# Patient Record
Sex: MALE | Race: BLACK OR AFRICAN AMERICAN | URBAN - METROPOLITAN AREA
[De-identification: names, ages, dates, MRNs, and addresses within clinical notes are randomized per-mention and may not be internally consistent; named-entity substitution may affect disease eponyms.]

---

## 2019-09-01 ENCOUNTER — INPATIENT (INPATIENT)
Facility: HOSPITAL | Age: 59
LOS: 3 days | DRG: 100 | End: 2019-09-05
Payer: COMMERCIAL

## 2019-09-01 VITALS
RESPIRATION RATE: 18 BRPM | OXYGEN SATURATION: 97 % | SYSTOLIC BLOOD PRESSURE: 111 MMHG | DIASTOLIC BLOOD PRESSURE: 64 MMHG | HEART RATE: 90 BPM

## 2019-09-01 DIAGNOSIS — E11.21 TYPE 2 DIABETES MELLITUS WITH DIABETIC NEPHROPATHY: ICD-10-CM

## 2019-09-01 DIAGNOSIS — Z78.9 OTHER SPECIFIED HEALTH STATUS: Chronic | ICD-10-CM

## 2019-09-01 LAB
ALBUMIN SERPL ELPH-MCNC: 4.1 G/DL — SIGNIFICANT CHANGE UP (ref 3.3–5)
ALBUMIN SERPL ELPH-MCNC: 4.4 G/DL — SIGNIFICANT CHANGE UP (ref 3.4–5)
ALP SERPL-CCNC: 108 U/L — SIGNIFICANT CHANGE UP (ref 40–120)
ALP SERPL-CCNC: 84 U/L — SIGNIFICANT CHANGE UP (ref 40–120)
ALT FLD-CCNC: 11 U/L — SIGNIFICANT CHANGE UP (ref 10–45)
ALT FLD-CCNC: 19 U/L — SIGNIFICANT CHANGE UP (ref 12–42)
ANION GAP SERPL CALC-SCNC: 14 MMOL/L — SIGNIFICANT CHANGE UP (ref 5–17)
ANION GAP SERPL CALC-SCNC: 25 MMOL/L — HIGH (ref 9–16)
APPEARANCE UR: ABNORMAL
APTT BLD: 22.6 SEC — LOW (ref 27.5–36.3)
AST SERPL-CCNC: 10 U/L — SIGNIFICANT CHANGE UP (ref 10–40)
AST SERPL-CCNC: 11 U/L — LOW (ref 15–37)
B-OH-BUTYR SERPL-SCNC: 0.5 MMOL/L — HIGH
BASE EXCESS BLDA CALC-SCNC: -3.2 MMOL/L — LOW (ref -2–3)
BASOPHILS NFR BLD AUTO: 0.5 % — SIGNIFICANT CHANGE UP (ref 0–2)
BILIRUB SERPL-MCNC: 0.4 MG/DL — SIGNIFICANT CHANGE UP (ref 0.2–1.2)
BILIRUB SERPL-MCNC: 0.9 MG/DL — SIGNIFICANT CHANGE UP (ref 0.2–1.2)
BILIRUB UR-MCNC: NEGATIVE — SIGNIFICANT CHANGE UP
BUN SERPL-MCNC: 13 MG/DL — SIGNIFICANT CHANGE UP (ref 7–23)
BUN SERPL-MCNC: 14 MG/DL — SIGNIFICANT CHANGE UP (ref 7–23)
CALCIUM SERPL-MCNC: 8.6 MG/DL — SIGNIFICANT CHANGE UP (ref 8.4–10.5)
CALCIUM SERPL-MCNC: 9.5 MG/DL — SIGNIFICANT CHANGE UP (ref 8.5–10.5)
CHLORIDE SERPL-SCNC: 106 MMOL/L — SIGNIFICANT CHANGE UP (ref 96–108)
CHLORIDE SERPL-SCNC: 99 MMOL/L — SIGNIFICANT CHANGE UP (ref 96–108)
CO2 SERPL-SCNC: 16 MMOL/L — LOW (ref 22–31)
CO2 SERPL-SCNC: 24 MMOL/L — SIGNIFICANT CHANGE UP (ref 22–31)
COLOR SPEC: YELLOW — SIGNIFICANT CHANGE UP
CREAT SERPL-MCNC: 0.93 MG/DL — SIGNIFICANT CHANGE UP (ref 0.5–1.3)
CREAT SERPL-MCNC: 1.41 MG/DL — HIGH (ref 0.5–1.3)
DIFF PNL FLD: ABNORMAL
EOSINOPHIL NFR BLD AUTO: 1.5 % — SIGNIFICANT CHANGE UP (ref 0–6)
ETHANOL SERPL-MCNC: <3 MG/DL — SIGNIFICANT CHANGE UP
GLUCOSE BLDC GLUCOMTR-MCNC: 122 MG/DL — HIGH (ref 70–99)
GLUCOSE BLDC GLUCOMTR-MCNC: 128 MG/DL — HIGH (ref 70–99)
GLUCOSE BLDC GLUCOMTR-MCNC: 132 MG/DL — HIGH (ref 70–99)
GLUCOSE BLDC GLUCOMTR-MCNC: 153 MG/DL — HIGH (ref 70–99)
GLUCOSE BLDC GLUCOMTR-MCNC: 205 MG/DL — HIGH (ref 70–99)
GLUCOSE SERPL-MCNC: 226 MG/DL — HIGH (ref 70–99)
GLUCOSE SERPL-MCNC: 406 MG/DL — HIGH (ref 70–99)
GLUCOSE UR QL: NEGATIVE — SIGNIFICANT CHANGE UP
HBA1C BLD-MCNC: 12.4 % — HIGH (ref 4–5.6)
HCO3 BLDA-SCNC: 20 MMOL/L — LOW (ref 21–28)
HCT VFR BLD CALC: 41.4 % — SIGNIFICANT CHANGE UP (ref 39–50)
HCT VFR BLD CALC: 47.8 % — SIGNIFICANT CHANGE UP (ref 39–50)
HGB BLD-MCNC: 13.3 G/DL — SIGNIFICANT CHANGE UP (ref 13–17)
HGB BLD-MCNC: 15.1 G/DL — SIGNIFICANT CHANGE UP (ref 13–17)
IMM GRANULOCYTES NFR BLD AUTO: 1.2 % — SIGNIFICANT CHANGE UP (ref 0–1.5)
INR BLD: 1.01 — SIGNIFICANT CHANGE UP (ref 0.88–1.16)
KETONES UR-MCNC: NEGATIVE — SIGNIFICANT CHANGE UP
LACTATE SERPL-SCNC: 1.3 MMOL/L — SIGNIFICANT CHANGE UP (ref 0.5–2)
LACTATE SERPL-SCNC: 4.3 MMOL/L — CRITICAL HIGH (ref 0.5–2)
LACTATE SERPL-SCNC: >15 MMOL/L — CRITICAL HIGH (ref 0.4–2)
LEUKOCYTE ESTERASE UR-ACNC: NEGATIVE — SIGNIFICANT CHANGE UP
LYMPHOCYTES # BLD AUTO: 27.5 % — SIGNIFICANT CHANGE UP (ref 13–44)
MAGNESIUM SERPL-MCNC: 2 MG/DL — SIGNIFICANT CHANGE UP (ref 1.6–2.6)
MCHC RBC-ENTMCNC: 29.4 PG — SIGNIFICANT CHANGE UP (ref 27–34)
MCHC RBC-ENTMCNC: 29.8 PG — SIGNIFICANT CHANGE UP (ref 27–34)
MCHC RBC-ENTMCNC: 31.6 G/DL — LOW (ref 32–36)
MCHC RBC-ENTMCNC: 32.1 GM/DL — SIGNIFICANT CHANGE UP (ref 32–36)
MCV RBC AUTO: 92.6 FL — SIGNIFICANT CHANGE UP (ref 80–100)
MCV RBC AUTO: 93 FL — SIGNIFICANT CHANGE UP (ref 80–100)
MONOCYTES NFR BLD AUTO: 6.1 % — SIGNIFICANT CHANGE UP (ref 2–14)
NEUTROPHILS NFR BLD AUTO: 63.2 % — SIGNIFICANT CHANGE UP (ref 43–77)
NITRITE UR-MCNC: NEGATIVE — SIGNIFICANT CHANGE UP
NRBC # BLD: 0 /100 WBCS — SIGNIFICANT CHANGE UP (ref 0–0)
PCO2 BLDA: 32 MMHG — LOW (ref 35–48)
PCO2 BLDV: 47 MMHG — SIGNIFICANT CHANGE UP (ref 41–51)
PH BLDA: 7.42 — SIGNIFICANT CHANGE UP (ref 7.35–7.45)
PH BLDV: 7.12 — CRITICAL LOW (ref 7.32–7.43)
PH UR: 5.5 — SIGNIFICANT CHANGE UP (ref 5–8)
PHENOBARB SERPL-MCNC: <2.1 UG/ML — LOW (ref 15–40)
PHENYTOIN FREE SERPL-MCNC: 1.2 UG/ML — LOW (ref 10–20)
PLATELET # BLD AUTO: 269 K/UL — SIGNIFICANT CHANGE UP (ref 150–400)
PLATELET # BLD AUTO: 378 K/UL — SIGNIFICANT CHANGE UP (ref 150–400)
PO2 BLDA: 116 MMHG — HIGH (ref 83–108)
PO2 BLDV: 63 MMHG — HIGH (ref 35–40)
POTASSIUM SERPL-MCNC: 3.4 MMOL/L — LOW (ref 3.5–5.3)
POTASSIUM SERPL-MCNC: 3.9 MMOL/L — SIGNIFICANT CHANGE UP (ref 3.5–5.3)
POTASSIUM SERPL-SCNC: 3.4 MMOL/L — LOW (ref 3.5–5.3)
POTASSIUM SERPL-SCNC: 3.9 MMOL/L — SIGNIFICANT CHANGE UP (ref 3.5–5.3)
PROT SERPL-MCNC: 6.6 G/DL — SIGNIFICANT CHANGE UP (ref 6–8.3)
PROT SERPL-MCNC: 8.4 G/DL — HIGH (ref 6.4–8.2)
PROT UR-MCNC: 30 MG/DL
PROTHROM AB SERPL-ACNC: 11.1 SEC — SIGNIFICANT CHANGE UP (ref 10–12.9)
RBC # BLD: 4.47 M/UL — SIGNIFICANT CHANGE UP (ref 4.2–5.8)
RBC # BLD: 5.14 M/UL — SIGNIFICANT CHANGE UP (ref 4.2–5.8)
RBC # FLD: 12.6 % — SIGNIFICANT CHANGE UP (ref 10.3–14.5)
RBC # FLD: 12.9 % — SIGNIFICANT CHANGE UP (ref 10.3–14.5)
SAO2 % BLDA: 98 % — SIGNIFICANT CHANGE UP (ref 95–100)
SAO2 % BLDV: 85 % — SIGNIFICANT CHANGE UP
SODIUM SERPL-SCNC: 140 MMOL/L — SIGNIFICANT CHANGE UP (ref 132–145)
SODIUM SERPL-SCNC: 144 MMOL/L — SIGNIFICANT CHANGE UP (ref 135–145)
SP GR SPEC: 1.01 — SIGNIFICANT CHANGE UP (ref 1–1.03)
T4 AB SER-ACNC: 9.21 UG/DL — SIGNIFICANT CHANGE UP (ref 3.17–11.72)
T4 FREE SERPL-MCNC: 1.12 NG/DL — SIGNIFICANT CHANGE UP (ref 0.7–1.48)
TROPONIN I SERPL-MCNC: <0.017 NG/ML — LOW (ref 0.02–0.06)
TSH SERPL-MCNC: 4.15 UIU/ML — HIGH (ref 0.36–3.74)
UROBILINOGEN FLD QL: 0.2 E.U./DL — SIGNIFICANT CHANGE UP
WBC # BLD: 13.76 K/UL — HIGH (ref 3.8–10.5)
WBC # BLD: 17.1 K/UL — HIGH (ref 3.8–10.5)
WBC # FLD AUTO: 13.76 K/UL — HIGH (ref 3.8–10.5)
WBC # FLD AUTO: 17.1 K/UL — HIGH (ref 3.8–10.5)

## 2019-09-01 PROCEDURE — 72125 CT NECK SPINE W/O DYE: CPT | Mod: 26

## 2019-09-01 PROCEDURE — 99222 1ST HOSP IP/OBS MODERATE 55: CPT

## 2019-09-01 PROCEDURE — 99291 CRITICAL CARE FIRST HOUR: CPT | Mod: 25

## 2019-09-01 PROCEDURE — 71045 X-RAY EXAM CHEST 1 VIEW: CPT | Mod: 26,77

## 2019-09-01 PROCEDURE — 31500 INSERT EMERGENCY AIRWAY: CPT

## 2019-09-01 PROCEDURE — 70450 CT HEAD/BRAIN W/O DYE: CPT | Mod: 26

## 2019-09-01 PROCEDURE — 71045 X-RAY EXAM CHEST 1 VIEW: CPT | Mod: 26

## 2019-09-01 PROCEDURE — 99292 CRITICAL CARE ADDL 30 MIN: CPT | Mod: 25

## 2019-09-01 PROCEDURE — 99291 CRITICAL CARE FIRST HOUR: CPT

## 2019-09-01 RX ORDER — CHLORHEXIDINE GLUCONATE 213 G/1000ML
1 SOLUTION TOPICAL
Refills: 0 | Status: DISCONTINUED | OUTPATIENT
Start: 2019-09-01 | End: 2019-09-03

## 2019-09-01 RX ORDER — ROCURONIUM BROMIDE 10 MG/ML
40 VIAL (ML) INTRAVENOUS ONCE
Refills: 0 | Status: COMPLETED | OUTPATIENT
Start: 2019-09-01 | End: 2019-09-01

## 2019-09-01 RX ORDER — ROCURONIUM BROMIDE 10 MG/ML
60 VIAL (ML) INTRAVENOUS ONCE
Refills: 0 | Status: COMPLETED | OUTPATIENT
Start: 2019-09-01 | End: 2019-09-01

## 2019-09-01 RX ORDER — INSULIN LISPRO 100/ML
VIAL (ML) SUBCUTANEOUS
Refills: 0 | Status: DISCONTINUED | OUTPATIENT
Start: 2019-09-01 | End: 2019-09-05

## 2019-09-01 RX ORDER — PANTOPRAZOLE SODIUM 20 MG/1
40 TABLET, DELAYED RELEASE ORAL EVERY 24 HOURS
Refills: 0 | Status: DISCONTINUED | OUTPATIENT
Start: 2019-09-01 | End: 2019-09-02

## 2019-09-01 RX ORDER — PROPOFOL 10 MG/ML
50 INJECTION, EMULSION INTRAVENOUS ONCE
Refills: 0 | Status: COMPLETED | OUTPATIENT
Start: 2019-09-01 | End: 2019-09-01

## 2019-09-01 RX ORDER — PROPOFOL 10 MG/ML
5 INJECTION, EMULSION INTRAVENOUS
Qty: 1000 | Refills: 0 | Status: DISCONTINUED | OUTPATIENT
Start: 2019-09-01 | End: 2019-09-02

## 2019-09-01 RX ORDER — DEXTROSE 50 % IN WATER 50 %
15 SYRINGE (ML) INTRAVENOUS ONCE
Refills: 0 | Status: DISCONTINUED | OUTPATIENT
Start: 2019-09-01 | End: 2019-09-05

## 2019-09-01 RX ORDER — ENOXAPARIN SODIUM 100 MG/ML
40 INJECTION SUBCUTANEOUS EVERY 24 HOURS
Refills: 0 | Status: DISCONTINUED | OUTPATIENT
Start: 2019-09-01 | End: 2019-09-05

## 2019-09-01 RX ORDER — SUCCINYLCHOLINE CHLORIDE 100 MG/5ML
100 SYRINGE (ML) INTRAVENOUS ONCE
Refills: 0 | Status: COMPLETED | OUTPATIENT
Start: 2019-09-01 | End: 2019-09-01

## 2019-09-01 RX ORDER — INSULIN HUMAN 100 [IU]/ML
9 INJECTION, SOLUTION SUBCUTANEOUS ONCE
Refills: 0 | Status: COMPLETED | OUTPATIENT
Start: 2019-09-01 | End: 2019-09-01

## 2019-09-01 RX ORDER — LEVETIRACETAM 250 MG/1
1000 TABLET, FILM COATED ORAL EVERY 12 HOURS
Refills: 0 | Status: DISCONTINUED | OUTPATIENT
Start: 2019-09-01 | End: 2019-09-03

## 2019-09-01 RX ORDER — DEXTROSE 50 % IN WATER 50 %
25 SYRINGE (ML) INTRAVENOUS ONCE
Refills: 0 | Status: DISCONTINUED | OUTPATIENT
Start: 2019-09-01 | End: 2019-09-05

## 2019-09-01 RX ORDER — SODIUM CHLORIDE 9 MG/ML
1000 INJECTION, SOLUTION INTRAVENOUS
Refills: 0 | Status: DISCONTINUED | OUTPATIENT
Start: 2019-09-01 | End: 2019-09-05

## 2019-09-01 RX ORDER — FENTANYL CITRATE 50 UG/ML
0.5 INJECTION INTRAVENOUS
Qty: 2500 | Refills: 0 | Status: DISCONTINUED | OUTPATIENT
Start: 2019-09-01 | End: 2019-09-02

## 2019-09-01 RX ORDER — SODIUM CHLORIDE 9 MG/ML
1000 INJECTION INTRAMUSCULAR; INTRAVENOUS; SUBCUTANEOUS ONCE
Refills: 0 | Status: COMPLETED | OUTPATIENT
Start: 2019-09-01 | End: 2019-09-01

## 2019-09-01 RX ORDER — CEFTRIAXONE 500 MG/1
2000 INJECTION, POWDER, FOR SOLUTION INTRAMUSCULAR; INTRAVENOUS ONCE
Refills: 0 | Status: COMPLETED | OUTPATIENT
Start: 2019-09-01 | End: 2019-09-01

## 2019-09-01 RX ORDER — SODIUM CHLORIDE 9 MG/ML
1000 INJECTION INTRAMUSCULAR; INTRAVENOUS; SUBCUTANEOUS
Refills: 0 | Status: DISCONTINUED | OUTPATIENT
Start: 2019-09-01 | End: 2019-09-01

## 2019-09-01 RX ORDER — GLUCAGON INJECTION, SOLUTION 0.5 MG/.1ML
1 INJECTION, SOLUTION SUBCUTANEOUS ONCE
Refills: 0 | Status: DISCONTINUED | OUTPATIENT
Start: 2019-09-01 | End: 2019-09-05

## 2019-09-01 RX ORDER — ETOMIDATE 2 MG/ML
20 INJECTION INTRAVENOUS ONCE
Refills: 0 | Status: COMPLETED | OUTPATIENT
Start: 2019-09-01 | End: 2019-09-01

## 2019-09-01 RX ORDER — DEXTROSE 50 % IN WATER 50 %
12.5 SYRINGE (ML) INTRAVENOUS ONCE
Refills: 0 | Status: DISCONTINUED | OUTPATIENT
Start: 2019-09-01 | End: 2019-09-05

## 2019-09-01 RX ORDER — MIDAZOLAM HYDROCHLORIDE 1 MG/ML
4 INJECTION, SOLUTION INTRAMUSCULAR; INTRAVENOUS ONCE
Refills: 0 | Status: DISCONTINUED | OUTPATIENT
Start: 2019-09-01 | End: 2019-09-01

## 2019-09-01 RX ORDER — NALOXONE HYDROCHLORIDE 4 MG/.1ML
0.2 SPRAY NASAL ONCE
Refills: 0 | Status: COMPLETED | OUTPATIENT
Start: 2019-09-01 | End: 2019-09-01

## 2019-09-01 RX ADMIN — ETOMIDATE 20 MILLIGRAM(S): 2 INJECTION INTRAVENOUS at 08:49

## 2019-09-01 RX ADMIN — SODIUM CHLORIDE 1000 MILLILITER(S): 9 INJECTION INTRAMUSCULAR; INTRAVENOUS; SUBCUTANEOUS at 11:19

## 2019-09-01 RX ADMIN — Medication 60 MILLIGRAM(S): at 10:09

## 2019-09-01 RX ADMIN — LEVETIRACETAM 400 MILLIGRAM(S): 250 TABLET, FILM COATED ORAL at 23:01

## 2019-09-01 RX ADMIN — PROPOFOL 2.7 MICROGRAM(S)/KG/MIN: 10 INJECTION, EMULSION INTRAVENOUS at 17:28

## 2019-09-01 RX ADMIN — Medication 40 MILLIGRAM(S): at 09:14

## 2019-09-01 RX ADMIN — Medication 100 MILLIGRAM(S): at 08:49

## 2019-09-01 RX ADMIN — PROPOFOL 2.7 MICROGRAM(S)/KG/MIN: 10 INJECTION, EMULSION INTRAVENOUS at 13:50

## 2019-09-01 RX ADMIN — PANTOPRAZOLE SODIUM 40 MILLIGRAM(S): 20 TABLET, DELAYED RELEASE ORAL at 11:58

## 2019-09-01 RX ADMIN — PROPOFOL 2.7 MICROGRAM(S)/KG/MIN: 10 INJECTION, EMULSION INTRAVENOUS at 21:03

## 2019-09-01 RX ADMIN — PROPOFOL 2.7 MICROGRAM(S)/KG/MIN: 10 INJECTION, EMULSION INTRAVENOUS at 10:15

## 2019-09-01 RX ADMIN — MIDAZOLAM HYDROCHLORIDE 4 MILLIGRAM(S): 1 INJECTION, SOLUTION INTRAMUSCULAR; INTRAVENOUS at 09:32

## 2019-09-01 RX ADMIN — Medication 2 MILLIGRAM(S): at 07:55

## 2019-09-01 RX ADMIN — PROPOFOL 50 MILLIGRAM(S): 10 INJECTION, EMULSION INTRAVENOUS at 08:55

## 2019-09-01 RX ADMIN — Medication 4: at 11:50

## 2019-09-01 RX ADMIN — LEVETIRACETAM 400 MILLIGRAM(S): 250 TABLET, FILM COATED ORAL at 11:42

## 2019-09-01 RX ADMIN — CEFTRIAXONE 100 MILLIGRAM(S): 500 INJECTION, POWDER, FOR SOLUTION INTRAMUSCULAR; INTRAVENOUS at 09:11

## 2019-09-01 RX ADMIN — ENOXAPARIN SODIUM 40 MILLIGRAM(S): 100 INJECTION SUBCUTANEOUS at 22:38

## 2019-09-01 RX ADMIN — NALOXONE HYDROCHLORIDE 0.2 MILLIGRAM(S): 4 SPRAY NASAL at 07:49

## 2019-09-01 RX ADMIN — FENTANYL CITRATE 4.5 MICROGRAM(S)/KG/HR: 50 INJECTION INTRAVENOUS at 12:46

## 2019-09-01 RX ADMIN — INSULIN HUMAN 9 UNIT(S): 100 INJECTION, SOLUTION SUBCUTANEOUS at 09:49

## 2019-09-01 RX ADMIN — SODIUM CHLORIDE 200 MILLILITER(S): 9 INJECTION INTRAMUSCULAR; INTRAVENOUS; SUBCUTANEOUS at 12:35

## 2019-09-01 NOTE — PATIENT PROFILE ADULT - NSPROGENSOURCEINFO_GEN_A_NUR
lacks capacity and has no surrogate decision maker lacks capacity and has no surrogate decision maker/patient

## 2019-09-01 NOTE — H&P ADULT - NSHPLABSRESULTS_GEN_ALL_CORE
.  LABS:                         13.3   13.76 )-----------( 269      ( 01 Sep 2019 11:38 )             41.4     09-01    144  |  106  |  13  ----------------------------<  226<H>  3.9   |  24  |  0.93    Ca    8.6      01 Sep 2019 11:38  Mg     2.0     09-01    TPro  6.6  /  Alb  4.1  /  TBili  0.4  /  DBili  x   /  AST  10  /  ALT  11  /  AlkPhos  84  09-01    PT/INR - ( 01 Sep 2019 08:23 )   PT: 11.1 sec;   INR: 1.01          PTT - ( 01 Sep 2019 08:23 )  PTT:22.6 sec    CARDIAC MARKERS ( 01 Sep 2019 08:23 )  <0.017 ng/mL / x     / x     / x     / x            Lactate, Blood: 4.3 mmoL/L (09-01 @ 11:37)  Lactate, Blood: >15.0 mmoL/L (09-01 @ 08:31)      RADIOLOGY, EKG & ADDITIONAL TESTS: Reviewed.

## 2019-09-01 NOTE — H&P ADULT - NSHPPHYSICALEXAM_GEN_ALL_CORE
.  VITAL SIGNS:  T(C): 36.6 (09-01-19 @ 13:05), Max: 36.6 (09-01-19 @ 13:05)  T(F): 97.9 (09-01-19 @ 13:05), Max: 97.9 (09-01-19 @ 13:05)  HR: 70 (09-01-19 @ 16:00) (66 - 130)  BP: 113/66 (09-01-19 @ 16:00) (96/53 - 181/86)  BP(mean): 85 (09-01-19 @ 16:00) (74 - 102)  RR: 17 (09-01-19 @ 16:00) (13 - 21)  SpO2: 99% (09-01-19 @ 16:00) (96% - 100%)  Wt(kg): --    PHYSICAL EXAM:    Constitutional: Intubated and sedated on propofol and fentanyl   Head: NC/AT  Eyes: PERRL, EOMI, anicteric sclera  ENT: no nasal discharge; uvula midline, no oropharyngeal erythema or exudates; MMM  Neck: supple; no JVD or thyromegaly  Respiratory: CTA B/L; no W/R/R, no retractions  Cardiac: +S1/S2; RRR; no M/R/G; PMI non-displaced  Gastrointestinal: abdomen soft, NT/ND; no rebound or guarding; +BSx4  Genitourinary: normal external genitalia  Back: spine midline, no bony tenderness or step-offs; no CVAT B/L  Extremities: WWP, no clubbing or cyanosis; no peripheral edema  Musculoskeletal: NROM x4; no joint swelling, tenderness or erythema  Vascular: 2+ radial, femoral, DP/PT pulses B/L  Dermatologic: skin warm, dry and intact; no rashes, wounds, or scars  Lymphatic: no submandibular or cervical LAD  Neurologic: AAOx3; CNII-XII grossly intact; no focal deficits  Psychiatric: affect and characteristics of appearance, verbalizations, behaviors are appropriate .  VITAL SIGNS:  T(C): 36.6 (09-01-19 @ 13:05), Max: 36.6 (09-01-19 @ 13:05)  T(F): 97.9 (09-01-19 @ 13:05), Max: 97.9 (09-01-19 @ 13:05)  HR: 70 (09-01-19 @ 16:00) (66 - 130)  BP: 113/66 (09-01-19 @ 16:00) (96/53 - 181/86)  BP(mean): 85 (09-01-19 @ 16:00) (74 - 102)  RR: 17 (09-01-19 @ 16:00) (13 - 21)  SpO2: 99% (09-01-19 @ 16:00) (96% - 100%)  Wt(kg): --    PHYSICAL EXAM:    Constitutional: Intubated and sedated on propofol and fentanyl   Head: NC/AT  Eyes: PERRL 2mm B/L, anicteric sclera  HEENT: MMM  Respiratory: CTA B/L; no W/R/R, no retractions  Cardiac: +S1/S2; RRR; no M/R/G  Gastrointestinal: abdomen soft, ND; +BSx4  Extremities: WWP, no clubbing or cyanosis; no peripheral edema  Vascular: 2+ radial, DP/PT pulses B/L  Dermatologic: skin warm, dry and intact; no rashes  Neurologic: Intubate and sedated, does not respond to noxious or verbal stimuli. Gag, pupil and corneal reflexes are intact

## 2019-09-01 NOTE — ED ADULT NURSE NOTE - OBJECTIVE STATEMENT
58y male presents to ED BIBA c/o multiple seizure episodes. Known hx of seizures, per EMS had one episode on train, dried blood found on shirt and around mouth. EMS endorses a second episode en route to ED lasting about 2 mins. no obvious signs of head injury, pupils constricted bilat. placed on cont pulse ox and cardiac telemetry, NRB, MD aware.

## 2019-09-01 NOTE — ED ADULT TRIAGE NOTE - CHIEF COMPLAINT QUOTE
Pt  brought in by EMS for seizures. PT had seizure on the train and witnessed seizure by 2 minutes EMS en route to ED. Blood noted to shirt. Pt postictal at this time.

## 2019-09-01 NOTE — ED ADULT NURSE NOTE - NSIMPLEMENTINTERV_GEN_ALL_ED
Implemented All Fall Risk Interventions:  [place] to call system. Call bell, personal items and telephone within reach. Instruct patient to call for assistance. Room bathroom lighting operational. Non-slip footwear when patient is off stretcher. Physically safe environment: no spills, clutter or unnecessary equipment. Stretcher in lowest position, wheels locked, appropriate side rails in place. Provide visual cue, wrist band, yellow gown, etc. Monitor gait and stability. Monitor for mental status changes and reorient to person, place, and time. Review medications for side effects contributing to fall risk. Reinforce activity limits and safety measures with patient and family.

## 2019-09-01 NOTE — ED PROVIDER NOTE - DIAGNOSTIC INTERPRETATION
Interpreted by ED physician Teresa   Chest x-ray, 1 view, Seizure  Lungs clear, heart shadow normal, bony structures normal, no free air under diaphragm, no PTX     Interpreted by ED physician Teresa   Chest x-ray, 1 view, Intubation, Post NG tube  Lungs clear, heart shadow normal, bony structures normal, no free air under diaphragm, no PTX

## 2019-09-01 NOTE — CONSULT NOTE ADULT - ASSESSMENT
58-year-old man presenting with status epilepticus (4 seizures within a 1-2 hour window without return to baseline in between).  I suspect that these seizures are most likely provoked by his hyperglycemia and metabolic acidosis, however it is possible that he may also have an underlying focal or generalized epilepsy.  Would recommend continuing Keppra 1g BID for seizure protection until metabolic disarray is corrected.  At that time we will be able to review his EEG off sedation and ideally obtain history from the patient regarding his seizure history in order to determine whether he needs long team AED treatment.    # Status epilepticus  -Keppra 1g BID  -Continue propofol   -Continue vEEG  -Management of metabolic abnormalities/DKA per MICU  -Not currently stable for MRI, will consider obtained once patient is stable in order to evaluate for lesional seizure focus  -Will follow    Mag Diamond MD  Attending Neurologist  Epilepsy/EEG 58-year-old man presenting with status epilepticus (4 seizures within a 1-2 hour window without return to baseline in between).  I suspect that these seizures are most likely provoked by his hyperglycemia and metabolic acidosis, however it is possible that he may also have an underlying focal or generalized epilepsy.  Would recommend continuing Keppra 1g BID for seizure protection until metabolic disarray is corrected.  At that time we will be able to review his EEG off sedation and ideally obtain history from the patient regarding his seizure history in order to determine whether he needs long team AED treatment.    # Status epilepticus, possible new diagnosis of provoked seizures and/or epilepsy  -Keppra 1g BID  -Continue propofol   -Continue vEEG  -Management of metabolic abnormalities/DKA per MICU  -Not currently stable for MRI, will consider obtained once patient is stable in order to evaluate for lesional seizure focus  -Will follow    Mag Diamond MD  Attending Neurologist  Epilepsy/EEG 58-year-old man presenting with status epilepticus (4 seizures within a 1-2 hour window without return to baseline in between).  I suspect that these seizures are most likely provoked by his hyperglycemia and metabolic acidosis, however it is possible that he may also have an underlying focal or generalized epilepsy.  Would recommend continuing Keppra 1g BID for seizure protection until metabolic disarray is corrected.  At that time we will be able to review his EEG off sedation and ideally obtain history from the patient regarding his seizure history in order to determine whether he needs long team AED treatment.    # Status epilepticus, possible new diagnosis of provoked seizures and/or epilepsy  -Keppra 1g BID  -Continue propofol   -Continue vEEG  -Management of metabolic abnormalities/DKA per MICU  -Not currently stable for MRI, will consider obtaining once patient is stable in order to evaluate for lesional seizure focus  -Will follow    Mag Diamond MD  Attending Neurologist  Epilepsy/EEG

## 2019-09-01 NOTE — CONSULT NOTE ADULT - SUBJECTIVE AND OBJECTIVE BOX
Epilepsy Consult Note    Patient Name:SHAHANA RAMIRES  MRN:[id number]  Date/Time:09-01-19 @ 13:43    Reason for consult: seizures    History of Present Illness:  58-year-old man with unknown past medical history (probable uncontrolled diabetes as A1c 12.4) who is admitted following 4 seizures earlier today.  Epilepsy is consulted regarding seizure management.  Patient was intubated and sedated at the time of my exam and thus unable to provide history.  History below obtained from [facility] ED attending Dr. Moore and the medical record.    [place] began having a GTC with urinary incontinence.  Train was stopped, EMS was called.  On their arrival he was noted to have pinpoint pupils and was treated with Narcan.  Had a second seizure following EMS arrival.  Brought to [facility] where he had two additional seizures, without return to baseline.  He was treated with Keppra 1g IV and Ativan 2 mg IV, then intubated and started on propofol for airway protection.  Labs at [facility] notable for WBC 17.1, lactate > 15, FSBG 300s-400s, VBG 7.12/47/63, tox+ for opiates and methadone.  Head CT did not show hemorrhage or other acute abnormalities.  He was transferred to [facility] MICU for further management of diabetic ketoacidosis and seizures.    Epilepsy Risk Factors:   Unable to obtain as patient was intubated/sedated.    Prior AEDs:  Unable to obtain as patient was intubated/sedated.    PAST MEDICAL & SURGICAL HISTORY:  Unable to obtain as patient was intubated/sedated.  Likely diabetes mellitus as A1c 12.4    MEDICATIONS  (STANDING):  chlorhexidine 4% Liquid 1 Application(s) Topical <User Schedule>  dextrose 5%. 1000 milliLiter(s) (50 mL/Hr) IV Continuous <Continuous>  dextrose 50% Injectable 12.5 Gram(s) IV Push once  dextrose 50% Injectable 25 Gram(s) IV Push once  dextrose 50% Injectable 25 Gram(s) IV Push once  fentaNYL   Infusion. 0.5 MICROgram(s)/kG/Hr (4.5 mL/Hr) IV Continuous <Continuous>  insulin lispro (HumaLOG) corrective regimen sliding scale   SubCutaneous Before meals and at bedtime  levETIRAcetam  IVPB 1000 milliGRAM(s) IV Intermittent every 12 hours  pantoprazole  Injectable 40 milliGRAM(s) IV Push every 24 hours  propofol Infusion 5 MICROgram(s)/kG/Min (2.7 mL/Hr) IV Continuous <Continuous>  sodium chloride 0.9%. 1000 milliLiter(s) (200 mL/Hr) IV Continuous <Continuous>    MEDICATIONS  (PRN):  dextrose 40% Gel 15 Gram(s) Oral once PRN Blood Glucose LESS THAN 70 milliGRAM(s)/deciliter  glucagon  Injectable 1 milliGRAM(s) IntraMuscular once PRN Glucose LESS THAN 70 milligrams/decilite    Allergies:  Unable to obtain    Family  History:  Unable to obtain    Social History:  Tox screen positive for opiates and methadone, negative for EtOH.  Otherwise unable to obtain.    Review of Systems:    ROS  Per HPI, otherwise unable to obtain as patient was intubated/sedated.    Physical Exam  T(C): 36.6 (09-01-19 @ 13:05), Max: 36.6 (09-01-19 @ 13:05)  HR: 68 (09-01-19 @ 13:30) (66 - 130)  BP: 105/63 (09-01-19 @ 13:30) (96/53 - 181/86)  RR: 13 (09-01-19 @ 13:30) (13 - 21)  SpO2: 100% (09-01-19 @ 13:30) (96% - 100%)    Constitutional: intubated, sedated  HEENT: moist mucous membranes, unable to examine oropharynx 2/2 ETT  Neck: supple, no lymphadenopathy  Respiratory: clear to auscultation bilaterally, no crackles or wheezing  Cardiovascular: regular rate and rhythm, normal S1/S2, no murmurs, no edema  GI: soft, non-tender, non-distended, bowel sounds present; no hepatosplenomegaly on palpation  Musculoskeletal: extremities warm, well-perfused, no joint swelling  Skin: no rashes, bruises, or lesions    Neurologic Exam:  Mental Status: does not open eyes to voice or follow commands  Cranial Nerves: I: deferred; II: pupils pinpoint and non-reactive, does not blink to threat; III, IV, VI: does not track; VII: face symmetric  Motor: normal bulk and tone, no orbiting or pronator drift, moving all 4 extremities spontaneously, no abnormal movements  Sensation: , withdraws to light touch in all 4 extremities  Coordination: unable to assess (intubated/sedated)  Reflexes: 2+ biceps, triceps, brachioradialis, patella.  Toes downgoing bilaterally, no clonus  Gait: unable to assess (intubated/sedated)      Labs:                        13.3   13.76 )-----------( 269      ( 01 Sep 2019 11:38 )             41.4     09-01    144  |  106  |  13  ----------------------------<  226<H>  3.9   |  24  |  0.93    Ca    8.6      01 Sep 2019 11:38  Mg     2.0     09-01    TPro  6.6  /  Alb  4.1  /  TBili  0.4  /  DBili  x   /  AST  10  /  ALT  11  /  AlkPhos  84  09-01    LIVER FUNCTIONS - ( 01 Sep 2019 11:38 )  Alb: 4.1 g/dL / Pro: 6.6 g/dL / ALK PHOS: 84 U/L / ALT: 11 U/L / AST: 10 U/L / GGT: x           PT/INR - ( 01 Sep 2019 08:23 )   PT: 11.1 sec;   INR: 1.01       PTT - ( 01 Sep 2019 08:23 )  PTT:22.6 sec    pH 7.12 / pCO2 47 / pO2 63    A1c 12.4%    Imaging:  Head CT reviewed by me, no evidence of mass, hemorrhage or infarct    EEG:  EEG reviewed by me, notable to diffuse slowing and attenuation with excess beta frequency activity, likely due to effects of benzodiazepines; no focal abnormalities, epileptiform discharges, or seizures

## 2019-09-01 NOTE — H&P ADULT - ASSESSMENT
58 yom with unknown pmhx presenting to [facility] obtunded s/p a witnessed tonic-clonic seizure. Patient proceeded to experience status epilepticus requiring intubation.     PLAN    NEURO:  #Status epilepticus 58 yom with unknown pmhx presenting to [facility] obtunded s/p a witnessed tonic-clonic seizure. Patient proceeded to experience status epilepticus requiring intubation.     PLAN    NEURO:  #Status epilepticus   -S/p ativan 2 mg, keppra 1 gram iv in the ED  -Unk seizure history  -CT head negative for acute pathology   -Patient intubated for airway protection  -Now on propofol and fentanyl for sedation, sedation vacation next am   -Undergoing 24h EEG   -Lactate >15  -Determining other possible etiologies for seizures: Infection (UA sent, BCx sent), Drugs (UA tox positive for opioids and methadone), Metabolic derangement (ABG showing mild resp alkalosis, Negative BHB)    ENDO:  #Elevated blood sugars  -Negative BHB  - 58 yom with unknown pmhx presenting to [facility] obtunded s/p a witnessed tonic-clonic seizure. Patient proceeded to experience status epilepticus requiring intubation.     PLAN    NEURO:  #Status epilepticus   -S/p ativan 2 mg, keppra 1 gram iv in the ED  -C/w Keppra 1g q12h   -Unk seizure history  -CT head negative for acute pathology   -Patient intubated for airway protection  -Now on propofol and fentanyl for sedation, sedation vacation tomorrow am   -Currently undergoing 24h EEG   -Lactate >15, trending   -Determining other possible etiologies for seizures: Infection (UA sent, BCx sent), Drugs (UA tox positive for opioids and methadone), Metabolic derangement (ABG showing mild resp alkalosis, Negative BHB)    ENDO:  #Elevated blood sugars, resolved   -374-->371-->205-->153-->128  -Negative BHB  -NS at 200cc/hr   -Lispro SS      SOCIAL:  #No collateral  -social work trying to ascertain family or friend contacts     F: 200cc/hr NS  E: Replete as necessary  N: NPO/None     CODE: FULL    DISPO: MICU 58 yom with unknown pmhx presenting to [facility] obtunded s/p a witnessed tonic-clonic seizure. Patient proceeded to experience status epilepticus requiring intubation.     PLAN    NEURO:  #Status epilepticus   -S/p ativan 2 mg, keppra 1 gram iv in the ED  -C/w Keppra 1g q12h   -Unk seizure history  -CT head negative for acute pathology   -Patient intubated for airway protection  -Now on propofol and fentanyl for sedation, sedation vacation tomorrow am   -Currently undergoing 24h EEG   -Lactate >15, cleared now   -Determining other possible etiologies for seizures: Infection (UA sent, BCx sent), Drugs (UA tox positive for opioids and methadone), Metabolic derangement (ABG showing mild resp alkalosis, Negative BHB)    ENDO:  #Elevated blood sugars, resolved   -374-->371-->205-->153-->128  -Negative BHB  -NS at 200cc/hr   -A1C of 12.4  -Lispro SS        SOCIAL:  #No collateral  -social work trying to ascertain family or friend contacts     F: 200cc/hr NS  E: Replete as necessary  N: NPO/None     CODE: FULL    DISPO: MICU 58 yom with unknown pmhx presenting to [facility] obtunded s/p a witnessed tonic-clonic seizure. Patient proceeded to experience status epilepticus requiring intubation.     PLAN    NEURO:  #Status epilepticus   -S/p ativan 2 mg, keppra 1 gram iv in the ED  -C/w Keppra 1g q12h   -Unk seizure history  -CT head negative for acute pathology   -Patient intubated for airway protection  -Now on propofol and fentanyl for sedation, sedation vacation tomorrow am   -Currently undergoing 24h EEG   -Lactate >15, cleared now   -Determining other possible etiologies for seizures: Infection (UA sent, BCx sent), Drugs (UA tox positive for opioids and methadone), Metabolic derangement (ABG showing mild resp alkalosis, Pending BHB, UA showing no ketones)      ENDO:  #Elevated blood sugars, resolved   -374-->371-->205-->153-->128  -Negative BHB  -NS at 200cc/hr   -A1C of 12.4  -Lispro SS for right now. If blood sugars worsen then dose for lantus       SOCIAL:  #No collateral  -social work trying to ascertain family or friend contacts     F: None   E: Replete as necessary  N: NPO/None     CODE: FULL    DISPO: MICU

## 2019-09-01 NOTE — H&P ADULT - ATTENDING COMMENTS
Status epilepticus with acute respiratory failure (failure to protect airway) s/p intubation with hyperglycemia and anion gap metabolic acidosis. S/C insulin was given in morning for hyperglycemia along with hydration. Hyperglycemia and metabolic acidosis resolved.   Plan:  - Continue propofol and fentanyl  - 24 hour EEG  - Keppra 1 gram bid  - Insulin sliding scale for now. May need to add Lantus  - D/c IVF  - Culture sent  - Rest as above

## 2019-09-01 NOTE — ED PROVIDER NOTE - CLINICAL SUMMARY MEDICAL DECISION MAKING FREE TEXT BOX
Pt had additional witnessed seizure here in ED lasting 1 minute, given ativan 2 mg, keppra 1 gram iv. This makes total of 3 seizures in span of 1 hour with no recovery in between: status epilepticus. Awaiting CT head/labs. Will continue to monitor airway.

## 2019-09-01 NOTE — PATIENT PROFILE ADULT - VISION (WITH CORRECTIVE LENSES IF THE PATIENT USUALLY WEARS THEM):
Normal vision: sees adequately in most situations; can see medication labels, newsprint Normal vision: sees adequately in most situations; can see medication labels, newsprint/pt states he uses reading glasses

## 2019-09-01 NOTE — PATIENT PROFILE ADULT - STATED REASON FOR ADMISSION
Report given by WILLIAM Snowden from [facility]. Patient has [place] seizure [place] subway train tonic clonic for about 5 min. Pt. got some sedation & paralyzing agent, IV Propofol,  and intubated & eventually transferred here to [facility] Report given by WILLIAM Snowden from [facility]. Patient had witnessed seizure from subway train tonic clonic for about 5 min. Pt. got some sedation & paralyzing agent, IV Propofol,  and intubated & eventually transferred here to [facility]

## 2019-09-01 NOTE — ED PROVIDER NOTE - OBJECTIVE STATEMENT
59 yo male biba after witnessed tc seizure on train. Unknown pmhx. + urinary incontinence. Triage reports pinpoint pupils upon arrival and pt was given narcan. arrival FSBS wnl.

## 2019-09-01 NOTE — PATIENT PROFILE ADULT - JOB HELP - DETAILS
pt states he has a part-time job (works 2 days a week), but is interested in finding a full-time job

## 2019-09-01 NOTE — ED PROVIDER NOTE - ENMT, MLM
Airway patent, Nasal mucosa clear. Mouth with normal mucosa. Throat has no vesicles, no oropharyngeal exudates and uvula is midline. Neg Marks's/raccoons.

## 2019-09-01 NOTE — ED PROVIDER NOTE - PROGRESS NOTE DETAILS
Discussed with ICU attending (Dr. Miah Crook), and Epilepsy attending (Dr. Diamond), via [facility]. Accepted [facility] ICU.

## 2019-09-01 NOTE — H&P ADULT - HISTORY OF PRESENT ILLNESS
57 yo male biba after witnessed tc seizure on train. Unknown pmhx. Came in unresponsive and unable to provide a detailed HPI. + urinary incontinence. Triage reports pinpoint pupils upon arrival and pt was given narcan. arrival FSBS wnl. 57 yo male brought in by ambulance to [facility] after a witnessed tonic-clonic seizure on train. Unknown pmhx. Came in obtunded and unable to provide a detailed HPI or give contact information for any relatives or friends. Positive urinary incontinence. Patient came into the ED with pinpoint pupils was given narcan.     ED Vitals: T 97, HR 90, /64, RR 18, SPO2 97. Labs significant for elevated WBC 17.1, lactate >15, UTOX positive for opoid and methadone. 59 yo male brought in by ambulance to [facility] after a witnessed tonic-clonic seizure on train. Unknown pmhx. Came in obtunded and unable to provide a detailed HPI or give contact information for any relatives or friends. Positive urinary incontinence. Patient came into the ED with pinpoint pupils was given narcan. Patient proceeded to experience status epilepticus requiring intubation     ED Vitals: T 97, HR 90, /64, RR 18, SPO2 97. Labs significant for elevated WBC 17.1, lactate >15, UTOX positive for opoid and methadone. 57 yo male brought in by ambulance to [facility] after a witnessed tonic-clonic seizure on train. Unknown pmhx. Came in obtunded and unable to provide a detailed HPI or give contact information for any relatives or friends. Positive urinary incontinence. Patient came into the ED with pinpoint pupils was given narcan. Patient proceeded to experience status epilepticus requiring intubation     ED Vitals: T 97, HR 90, /64, RR 18, SPO2 97. Labs significant for elevated WBC 17.1, lactate >15, UTOX positive for opoid and methadone. Received ativan 2 mg, keppra 1 gram iv in the ED.

## 2019-09-01 NOTE — EEG REPORT - NS EEG TEXT BOX
Preliminary EEG Report    9/1/2019 @ 11:47:39 AM to 3 PM    Background: discontinuous (10-49% suppressed), consisting of low-voltage delta activity with overriding beta activity, intermixed with 1-3 second periods of diffuse suppression  Symmetry:  No persistent asymmetries of voltage or frequency.  Posterior Dominant Rhythm: absent.  Organization: Absent.  Voltage:  Low (most <20uV LBP Peak-Trough)  Variability: No. 						  Reactivity: No.  N2 sleep: Absent.  Spontaneous Activity:  No epileptiform discharges.  Periodic/rhythmic activity:  None.  Events:  No electrographic seizures or significant clinical events.  Provocations:  Hyperventilation and Photic stimulation: was not performed.    Daily Summary:    Severe generalized slowing with overriding beta activity.  Sedative medications may contribute to this finding.    Read by: Mag Diamond MD

## 2019-09-01 NOTE — ED ADULT NURSE REASSESSMENT NOTE - NS ED NURSE REASSESS COMMENT FT1
patient had generalized tonic clonic seizure lasting about 45 seconds. no head injury, +post ictal unresponsiveness, IV ativan given per MD order, cardiac telemetry, cont pulse ox, and NRB maintained.

## 2019-09-02 LAB
ANION GAP SERPL CALC-SCNC: 11 MMOL/L — SIGNIFICANT CHANGE UP (ref 5–17)
BASOPHILS # BLD AUTO: 0.02 K/UL — SIGNIFICANT CHANGE UP (ref 0–0.2)
BASOPHILS NFR BLD AUTO: 0.2 % — SIGNIFICANT CHANGE UP (ref 0–2)
BUN SERPL-MCNC: 21 MG/DL — SIGNIFICANT CHANGE UP (ref 7–23)
CALCIUM SERPL-MCNC: 7.6 MG/DL — LOW (ref 8.4–10.5)
CHLORIDE SERPL-SCNC: 110 MMOL/L — HIGH (ref 96–108)
CK SERPL-CCNC: 118 U/L — SIGNIFICANT CHANGE UP (ref 30–200)
CO2 SERPL-SCNC: 18 MMOL/L — LOW (ref 22–31)
CREAT ?TM UR-MCNC: 157 MG/DL — SIGNIFICANT CHANGE UP
CREAT SERPL-MCNC: 2.74 MG/DL — HIGH (ref 0.5–1.3)
CULTURE RESULTS: NO GROWTH — SIGNIFICANT CHANGE UP
EOSINOPHIL # BLD AUTO: 0.06 K/UL — SIGNIFICANT CHANGE UP (ref 0–0.5)
EOSINOPHIL NFR BLD AUTO: 0.7 % — SIGNIFICANT CHANGE UP (ref 0–6)
GLUCOSE BLDC GLUCOMTR-MCNC: 114 MG/DL — HIGH (ref 70–99)
GLUCOSE BLDC GLUCOMTR-MCNC: 147 MG/DL — HIGH (ref 70–99)
GLUCOSE BLDC GLUCOMTR-MCNC: 155 MG/DL — HIGH (ref 70–99)
GLUCOSE BLDC GLUCOMTR-MCNC: 196 MG/DL — HIGH (ref 70–99)
GLUCOSE SERPL-MCNC: 191 MG/DL — HIGH (ref 70–99)
HCT VFR BLD CALC: 34.6 % — LOW (ref 39–50)
HCV AB S/CO SERPL IA: 0.05 S/CO — SIGNIFICANT CHANGE UP
HCV AB SERPL-IMP: SIGNIFICANT CHANGE UP
HGB BLD-MCNC: 11.2 G/DL — LOW (ref 13–17)
IMM GRANULOCYTES NFR BLD AUTO: 0.8 % — SIGNIFICANT CHANGE UP (ref 0–1.5)
LYMPHOCYTES # BLD AUTO: 1.23 K/UL — SIGNIFICANT CHANGE UP (ref 1–3.3)
LYMPHOCYTES # BLD AUTO: 13.6 % — SIGNIFICANT CHANGE UP (ref 13–44)
MAGNESIUM SERPL-MCNC: 1.8 MG/DL — SIGNIFICANT CHANGE UP (ref 1.6–2.6)
MCHC RBC-ENTMCNC: 29.7 PG — SIGNIFICANT CHANGE UP (ref 27–34)
MCHC RBC-ENTMCNC: 32.4 GM/DL — SIGNIFICANT CHANGE UP (ref 32–36)
MCV RBC AUTO: 91.8 FL — SIGNIFICANT CHANGE UP (ref 80–100)
MONOCYTES # BLD AUTO: 0.64 K/UL — SIGNIFICANT CHANGE UP (ref 0–0.9)
MONOCYTES NFR BLD AUTO: 7.1 % — SIGNIFICANT CHANGE UP (ref 2–14)
NEUTROPHILS # BLD AUTO: 7.02 K/UL — SIGNIFICANT CHANGE UP (ref 1.8–7.4)
NEUTROPHILS NFR BLD AUTO: 77.6 % — HIGH (ref 43–77)
NRBC # BLD: 0 /100 WBCS — SIGNIFICANT CHANGE UP (ref 0–0)
OSMOLALITY UR: 326 MOSMOL/KG — SIGNIFICANT CHANGE UP (ref 100–650)
PHOSPHATE SERPL-MCNC: 2.9 MG/DL — SIGNIFICANT CHANGE UP (ref 2.5–4.5)
PLATELET # BLD AUTO: 225 K/UL — SIGNIFICANT CHANGE UP (ref 150–400)
POTASSIUM SERPL-MCNC: 4 MMOL/L — SIGNIFICANT CHANGE UP (ref 3.5–5.3)
POTASSIUM SERPL-SCNC: 4 MMOL/L — SIGNIFICANT CHANGE UP (ref 3.5–5.3)
RBC # BLD: 3.77 M/UL — LOW (ref 4.2–5.8)
RBC # FLD: 13.3 % — SIGNIFICANT CHANGE UP (ref 10.3–14.5)
SODIUM SERPL-SCNC: 139 MMOL/L — SIGNIFICANT CHANGE UP (ref 135–145)
SODIUM UR-SCNC: 46 MMOL/L — SIGNIFICANT CHANGE UP
SPECIMEN SOURCE: SIGNIFICANT CHANGE UP
T3 SERPL-MCNC: 96 NG/DL — SIGNIFICANT CHANGE UP (ref 80–200)
UUN UR-MCNC: 265 MG/DL — SIGNIFICANT CHANGE UP
WBC # BLD: 9.04 K/UL — SIGNIFICANT CHANGE UP (ref 3.8–10.5)
WBC # FLD AUTO: 9.04 K/UL — SIGNIFICANT CHANGE UP (ref 3.8–10.5)

## 2019-09-02 PROCEDURE — 99232 SBSQ HOSP IP/OBS MODERATE 35: CPT

## 2019-09-02 PROCEDURE — 99233 SBSQ HOSP IP/OBS HIGH 50: CPT | Mod: GC

## 2019-09-02 PROCEDURE — 95951: CPT | Mod: 26

## 2019-09-02 RX ORDER — SODIUM,POTASSIUM PHOSPHATES 278-250MG
1 POWDER IN PACKET (EA) ORAL ONCE
Refills: 0 | Status: COMPLETED | OUTPATIENT
Start: 2019-09-02 | End: 2019-09-02

## 2019-09-02 RX ORDER — MAGNESIUM SULFATE 500 MG/ML
2 VIAL (ML) INJECTION ONCE
Refills: 0 | Status: COMPLETED | OUTPATIENT
Start: 2019-09-02 | End: 2019-09-02

## 2019-09-02 RX ORDER — SODIUM CHLORIDE 9 MG/ML
500 INJECTION, SOLUTION INTRAVENOUS ONCE
Refills: 0 | Status: COMPLETED | OUTPATIENT
Start: 2019-09-02 | End: 2019-09-02

## 2019-09-02 RX ORDER — SODIUM CHLORIDE 9 MG/ML
500 INJECTION INTRAMUSCULAR; INTRAVENOUS; SUBCUTANEOUS ONCE
Refills: 0 | Status: DISCONTINUED | OUTPATIENT
Start: 2019-09-02 | End: 2019-09-02

## 2019-09-02 RX ORDER — INSULIN GLARGINE 100 [IU]/ML
12 INJECTION, SOLUTION SUBCUTANEOUS AT BEDTIME
Refills: 0 | Status: DISCONTINUED | OUTPATIENT
Start: 2019-09-02 | End: 2019-09-05

## 2019-09-02 RX ADMIN — Medication 2: at 11:20

## 2019-09-02 RX ADMIN — SODIUM CHLORIDE 1000 MILLILITER(S): 9 INJECTION, SOLUTION INTRAVENOUS at 08:46

## 2019-09-02 RX ADMIN — Medication 50 GRAM(S): at 08:47

## 2019-09-02 RX ADMIN — Medication 2: at 21:18

## 2019-09-02 RX ADMIN — LEVETIRACETAM 400 MILLIGRAM(S): 250 TABLET, FILM COATED ORAL at 11:20

## 2019-09-02 RX ADMIN — ENOXAPARIN SODIUM 40 MILLIGRAM(S): 100 INJECTION SUBCUTANEOUS at 21:19

## 2019-09-02 RX ADMIN — PROPOFOL 2.7 MICROGRAM(S)/KG/MIN: 10 INJECTION, EMULSION INTRAVENOUS at 01:05

## 2019-09-02 RX ADMIN — Medication 1 PACKET(S): at 14:34

## 2019-09-02 RX ADMIN — INSULIN GLARGINE 12 UNIT(S): 100 INJECTION, SOLUTION SUBCUTANEOUS at 21:19

## 2019-09-02 NOTE — PROGRESS NOTE ADULT - ASSESSMENT
58 yom with unknown pmhx presenting to [facility] obtunded s/p a witnessed tonic-clonic seizure. Patient proceeded to experience status epilepticus requiring intubation.     PLAN    NEURO:  #Status epilepticus  -No known seizure history as per patient   -C/w Keppra 1g q12h    -C/w EEG now that pt is off sedation    -Determining other possible etiologies for seizures: Infection (UA shows no infection, BCx NGTD), Drugs (Utox positive for opioids and methadone), Metabolic derangement (ABG showing mild resp alkalosis, Negative BHB, UA showing no ketones)  -Patient extubated and AOx3   -Patient states he takes 4mg of methadone Q2 days       PULM:  #No active issues      CV:  #No active issues      ENDO:  #Elevated blood sugars, resolved   -374-->371-->205-->153-->128  -Negative BHB   -A1C of 12.4  -Lispro SS  -Dose for lantus tonight, no need for bridge insulin till lantus dose   -Home meds include: jenuvia and metformin      RENAL:  #MELI  -Ulytes ordered, indicate pre-renal   -CK wnl   -Urine output improved after 500cc bolus of LR  -If urine output decreases consider another bolus of LR  -D/c baker       F: None   E: Replete as necessary  N: NPO/None   Ppx: GI-none, DVT-Lovenox     CODE: FULL    DISPO: MICU

## 2019-09-02 NOTE — EEG REPORT - NS EEG TEXT BOX
Continuous EEG Report  9/1/2019 @ 11:47:39 AM to 9/2/2019 @ 07:00 AM    Background: discontinuous (10-49% suppressed), consisting of low-voltage delta activity with overriding beta activity, intermixed with 1-3 second periods of diffuse suppression  Symmetry:  No persistent asymmetries of voltage or frequency.  Posterior Dominant Rhythm: absent.  Organization: Absent.  Voltage:  Low (most <20uV LBP Peak-Trough)  Variability: No. 						  Reactivity: No.  N2 sleep: Absent.  Spontaneous Activity:  No epileptiform discharges.  Periodic/rhythmic activity:  None.  Events:  No electrographic seizures or significant clinical events.  Provocations:  Hyperventilation and Photic stimulation: was not performed.    Daily Summary:    Severe generalized slowing with overriding beta activity.  Sedative medications may contribute to this finding.    Read by: Mag Diamond MD

## 2019-09-02 NOTE — SBIRT NOTE ADULT - NSSBIRTDRGPASSREFTXDET_GEN_A_CORE
Pt. is already in residential treatment at [facility], [phone]. He states he uses heroin very infrequently.

## 2019-09-02 NOTE — PROGRESS NOTE ADULT - ASSESSMENT
58-year-old man who presented 9/1 with status epilepticus (4 seizures within a 1-2 hour window without return to baseline in between), likely provoked by hyperglycemia and metabolic acidosis, now resolved.  Patient denies any history of seizures and current EEG off sedation shows no focal abnormalities, epileptiform discharges, or seizures.  Thus I do not suspect that he has underlying epilepsy but rather that yesterday's seizures were provoked by his hyperglycemia and metabolic disarray, and therefore would not recommend continuing long-term AED therapy.    # Uncontrolled diabetes c/b hyperglycemic state and status epilepticus, now resolved  -Taper Keppra, recommend 500 mg tonight and tomorrow AM then stop  -Continue vEEG while Keppra is weaned  -Diabetes management per MICU  -Will follow    Mag Diamond MD  Attending Neurologist  Epilepsy/EEG

## 2019-09-02 NOTE — PROGRESS NOTE ADULT - SUBJECTIVE AND OBJECTIVE BOX
Interval History:   Sedation weaned this morning.  Patient extubated successfully to 50% face mask and then to room air.  On exam this morning, patient asks how long he is in the hospital and does not remember having a seizure.  He denies having ever had a seizure in the past.  He does report having intermittent headaches but does not have one currently.  EEG after sedation wean shows minimal generalized slowing but otherwise normal awake background without focal abnormalities.  Patient reports that he has diabetes for which he takes multiple medications at baseline.  Hyperglycemia now resolved, remains on insulin sliding scale.    ROS:  Neuro: No headache, vision change, numbness, weakness, tingling, seizures.    Medications:  MEDICATIONS  (STANDING):  chlorhexidine 4% Liquid 1 Application(s) Topical <User Schedule>  dextrose 5%. 1000 milliLiter(s) (50 mL/Hr) IV Continuous <Continuous>  dextrose 50% Injectable 12.5 Gram(s) IV Push once  dextrose 50% Injectable 25 Gram(s) IV Push once  dextrose 50% Injectable 25 Gram(s) IV Push once  enoxaparin Injectable 40 milliGRAM(s) SubCutaneous every 24 hours  insulin lispro (HumaLOG) corrective regimen sliding scale   SubCutaneous Before meals and at bedtime  levETIRAcetam  IVPB 1000 milliGRAM(s) IV Intermittent every 12 hours  potassium phosphate / sodium phosphate powder 1 Packet(s) Oral once    Physical Exam:  ICU Vital Signs Last 24 Hrs  T(C): 37.8 (02 Sep 2019 09:49), Max: 38 (02 Sep 2019 06:02)  T(F): 100.1 (02 Sep 2019 09:49), Max: 100.4 (02 Sep 2019 06:02)  HR: 90 (02 Sep 2019 11:00) (66 - 90)  BP: 131/65 (02 Sep 2019 11:00) (101/55 - 137/66)  BP(mean): 94 (02 Sep 2019 11:00) (74 - 99)  ABP: --  ABP(mean): --  RR: 19 (02 Sep 2019 11:00) (13 - 19)  SpO2: 99% (02 Sep 2019 11:00) (97% - 100%)    General: no apparent distress  HEENT: +right anterior tongue bite  Respiratory: no increased work of breathing, stable on room air  Neuro: awake and alert, speech fluent and prosodic with no paraphasic errors, pupils equal round and reactive 2 mm, tracks in all directions, face symmetric, tongue midline, no orbiting or pronator drift, moves all 4 extremities symmetrically with full strength    Labs:                        11.2   9.04  )-----------( 225      ( 02 Sep 2019 06:30 )             34.6       139  |  110<H>  |  21  ----------------------------<  191<H>  4.0   |  18<L>  |  2.74<H>    Ca    7.6<L>      02 Sep 2019 06:30  Phos  2.9     09-02  Mg     1.8     09-02    TPro  6.6  /  Alb  4.1  /  TBili  0.4  /  DBili  x   /  AST  10  /  ALT  11  /  AlkPhos  84  09-01    EEG:  Minimal generalized background slowing with 8 Hz symmetric posterior dominant rhythm

## 2019-09-02 NOTE — PROGRESS NOTE ADULT - SUBJECTIVE AND OBJECTIVE BOX
SHAHANA RAMIRES  58y  Male    Patient is a 58y old  Male who presents with a chief complaint of Status epilepticus (02 Sep 2019 11:50)      INTERVAL HPI/OVERNIGHT EVENTS: O/n there were no acute events. This morning patient's Cr was found to be elevated significantly from yesterday. 500cc LR bolus was given and a CK and Ulytes was sent off. In addition, the patient's urine output was low but improved after the bolus. Patient was intubated when examined this am, but was later extubated without issue.       T(C): 37.8 (19 @ 09:49), Max: 38 (19 @ 06:02)  HR: 76 (19 @ 13:00) (66 - 90)  BP: 125/65 (19 @ 13:00) (101/55 - 137/66)  RR: 18 (19 @ 13:00) (13 - 19)  SpO2: 96% (19 @ 13:00) (96% - 100%)  Wt(kg): --Vital Signs Last 24 Hrs  T(C): 37.8 (02 Sep 2019 09:49), Max: 38 (02 Sep 2019 06:02)  T(F): 100.1 (02 Sep 2019 09:49), Max: 100.4 (02 Sep 2019 06:02)  HR: 76 (02 Sep 2019 13:00) (66 - 90)  BP: 125/65 (02 Sep 2019 13:00) (101/55 - 137/66)  BP(mean): 90 (02 Sep 2019 13:00) (74 - 99)  RR: 18 (02 Sep 2019 13:00) (13 - 19)  SpO2: 96% (02 Sep 2019 13:00) (96% - 100%)    PHYSICAL EXAM:  GENERAL: NAD, intubated   EYES: PERRLA, conjunctiva and sclera clear  ENMT: Moist mucous membranes  NERVOUS SYSTEM:  Positive gag, pupil and corneal reflex, patient responded to verbal stimuli.   CHEST/LUNG: Clear to auscultation bilaterally; No rales, rhonchi, wheezing, or rubs  HEART: Regular rate and rhythm; No murmurs, rubs, or gallops  ABDOMEN: Soft, Nontender, Nondistended; Bowel sounds present  EXTREMITIES:  WWP, No clubbing, cyanosis, or edema  Vascular: 2+ peripheral pulses x4  SKIN: No rashes or lesions    Consultant(s) Notes Reviewed:  [x ] YES  [ ] NO  Care Discussed with Consultants/Other Providers [ x] YES  [ ] NO    LABS:                        11.2   9.04  )-----------( 225      ( 02 Sep 2019 06:30 )             34.6         139  |  110<H>  |  21  ----------------------------<  191<H>  4.0   |  18<L>  |  2.74<H>    Ca    7.6<L>      02 Sep 2019 06:30  Phos  2.9       Mg     1.8         TPro  6.6  /  Alb  4.1  /  TBili  0.4  /  DBili  x   /  AST  10  /  ALT  11  /  AlkPhos  84  09      PT/INR - ( 01 Sep 2019 08:23 )   PT: 11.1 sec;   INR: 1.01          PTT - ( 01 Sep 2019 08:23 )  PTT:22.6 sec  Urinalysis Basic - ( 01 Sep 2019 17:34 )    Color: Yellow / Appearance: SL Cloudy / S.010 / pH: x  Gluc: x / Ketone: NEGATIVE  / Bili: Negative / Urobili: 0.2 E.U./dL   Blood: x / Protein: 30 mg/dL / Nitrite: NEGATIVE   Leuk Esterase: NEGATIVE / RBC: > 10 /HPF / WBC 5-10 /HPF   Sq Epi: x / Non Sq Epi: 0-5 /HPF / Bacteria: Present /HPF      CAPILLARY BLOOD GLUCOSE      POCT Blood Glucose.: 155 mg/dL (02 Sep 2019 11:07)  POCT Blood Glucose.: 147 mg/dL (02 Sep 2019 06:43)  POCT Blood Glucose.: 132 mg/dL (01 Sep 2019 22:47)  POCT Blood Glucose.: 122 mg/dL (01 Sep 2019 17:21)  POCT Blood Glucose.: 128 mg/dL (01 Sep 2019 16:17)  POCT Blood Glucose.: 153 mg/dL (01 Sep 2019 15:16)      ABG - ( 01 Sep 2019 15:00 )  pH, Arterial: 7.42  pH, Blood: x     /  pCO2: 32    /  pO2: 116   / HCO3: 20    / Base Excess: -3.2  /  SaO2: 98                Urinalysis Basic - ( 01 Sep 2019 17:34 )    Color: Yellow / Appearance: SL Cloudy / S.010 / pH: x  Gluc: x / Ketone: NEGATIVE  / Bili: Negative / Urobili: 0.2 E.U./dL   Blood: x / Protein: 30 mg/dL / Nitrite: NEGATIVE   Leuk Esterase: NEGATIVE / RBC: > 10 /HPF / WBC 5-10 /HPF   Sq Epi: x / Non Sq Epi: 0-5 /HPF / Bacteria: Present /HPF        RADIOLOGY & ADDITIONAL TESTS:    Imaging Personally Reviewed:  [ ] YES  [ ] NO    HEALTH ISSUES - PROBLEM Dx:

## 2019-09-03 DIAGNOSIS — Z91.89 OTHER SPECIFIED PERSONAL RISK FACTORS, NOT ELSEWHERE CLASSIFIED: ICD-10-CM

## 2019-09-03 DIAGNOSIS — N17.9 ACUTE KIDNEY FAILURE, UNSPECIFIED: ICD-10-CM

## 2019-09-03 DIAGNOSIS — D64.9 ANEMIA, UNSPECIFIED: ICD-10-CM

## 2019-09-03 DIAGNOSIS — F19.10 OTHER PSYCHOACTIVE SUBSTANCE ABUSE, UNCOMPLICATED: ICD-10-CM

## 2019-09-03 DIAGNOSIS — E87.2 ACIDOSIS: ICD-10-CM

## 2019-09-03 DIAGNOSIS — E11.9 TYPE 2 DIABETES MELLITUS WITHOUT COMPLICATIONS: ICD-10-CM

## 2019-09-03 DIAGNOSIS — R63.8 OTHER SYMPTOMS AND SIGNS CONCERNING FOOD AND FLUID INTAKE: ICD-10-CM

## 2019-09-03 DIAGNOSIS — G40.901 EPILEPSY, UNSPECIFIED, NOT INTRACTABLE, WITH STATUS EPILEPTICUS: ICD-10-CM

## 2019-09-03 LAB
ANION GAP SERPL CALC-SCNC: 10 MMOL/L — SIGNIFICANT CHANGE UP (ref 5–17)
BASOPHILS # BLD AUTO: 0.03 K/UL — SIGNIFICANT CHANGE UP (ref 0–0.2)
BASOPHILS NFR BLD AUTO: 0.3 % — SIGNIFICANT CHANGE UP (ref 0–2)
BUN SERPL-MCNC: 23 MG/DL — SIGNIFICANT CHANGE UP (ref 7–23)
CALCIUM SERPL-MCNC: 8.3 MG/DL — LOW (ref 8.4–10.5)
CHLORIDE SERPL-SCNC: 114 MMOL/L — HIGH (ref 96–108)
CO2 SERPL-SCNC: 18 MMOL/L — LOW (ref 22–31)
CREAT SERPL-MCNC: 2.71 MG/DL — HIGH (ref 0.5–1.3)
EOSINOPHIL # BLD AUTO: 0.17 K/UL — SIGNIFICANT CHANGE UP (ref 0–0.5)
EOSINOPHIL NFR BLD AUTO: 1.8 % — SIGNIFICANT CHANGE UP (ref 0–6)
GLUCOSE BLDC GLUCOMTR-MCNC: 132 MG/DL — HIGH (ref 70–99)
GLUCOSE BLDC GLUCOMTR-MCNC: 191 MG/DL — HIGH (ref 70–99)
GLUCOSE BLDC GLUCOMTR-MCNC: 204 MG/DL — HIGH (ref 70–99)
GLUCOSE BLDC GLUCOMTR-MCNC: 258 MG/DL — HIGH (ref 70–99)
GLUCOSE SERPL-MCNC: 140 MG/DL — HIGH (ref 70–99)
HCT VFR BLD CALC: 38 % — LOW (ref 39–50)
HGB BLD-MCNC: 12.2 G/DL — LOW (ref 13–17)
IMM GRANULOCYTES NFR BLD AUTO: 0.6 % — SIGNIFICANT CHANGE UP (ref 0–1.5)
LYMPHOCYTES # BLD AUTO: 1.54 K/UL — SIGNIFICANT CHANGE UP (ref 1–3.3)
LYMPHOCYTES # BLD AUTO: 16.7 % — SIGNIFICANT CHANGE UP (ref 13–44)
MAGNESIUM SERPL-MCNC: 2.4 MG/DL — SIGNIFICANT CHANGE UP (ref 1.6–2.6)
MCHC RBC-ENTMCNC: 29.5 PG — SIGNIFICANT CHANGE UP (ref 27–34)
MCHC RBC-ENTMCNC: 32.1 GM/DL — SIGNIFICANT CHANGE UP (ref 32–36)
MCV RBC AUTO: 91.8 FL — SIGNIFICANT CHANGE UP (ref 80–100)
MONOCYTES # BLD AUTO: 0.81 K/UL — SIGNIFICANT CHANGE UP (ref 0–0.9)
MONOCYTES NFR BLD AUTO: 8.8 % — SIGNIFICANT CHANGE UP (ref 2–14)
NEUTROPHILS # BLD AUTO: 6.63 K/UL — SIGNIFICANT CHANGE UP (ref 1.8–7.4)
NEUTROPHILS NFR BLD AUTO: 71.8 % — SIGNIFICANT CHANGE UP (ref 43–77)
NRBC # BLD: 0 /100 WBCS — SIGNIFICANT CHANGE UP (ref 0–0)
PHOSPHATE SERPL-MCNC: 4.1 MG/DL — SIGNIFICANT CHANGE UP (ref 2.5–4.5)
PLATELET # BLD AUTO: 227 K/UL — SIGNIFICANT CHANGE UP (ref 150–400)
POTASSIUM SERPL-MCNC: 3.9 MMOL/L — SIGNIFICANT CHANGE UP (ref 3.5–5.3)
POTASSIUM SERPL-SCNC: 3.9 MMOL/L — SIGNIFICANT CHANGE UP (ref 3.5–5.3)
RBC # BLD: 4.14 M/UL — LOW (ref 4.2–5.8)
RBC # FLD: 13 % — SIGNIFICANT CHANGE UP (ref 10.3–14.5)
SODIUM SERPL-SCNC: 142 MMOL/L — SIGNIFICANT CHANGE UP (ref 135–145)
WBC # BLD: 9.24 K/UL — SIGNIFICANT CHANGE UP (ref 3.8–10.5)
WBC # FLD AUTO: 9.24 K/UL — SIGNIFICANT CHANGE UP (ref 3.8–10.5)

## 2019-09-03 PROCEDURE — 99233 SBSQ HOSP IP/OBS HIGH 50: CPT | Mod: GC

## 2019-09-03 PROCEDURE — 95951: CPT | Mod: 26

## 2019-09-03 PROCEDURE — 99231 SBSQ HOSP IP/OBS SF/LOW 25: CPT

## 2019-09-03 PROCEDURE — 99291 CRITICAL CARE FIRST HOUR: CPT

## 2019-09-03 RX ADMIN — Medication 4: at 11:33

## 2019-09-03 RX ADMIN — ENOXAPARIN SODIUM 40 MILLIGRAM(S): 100 INJECTION SUBCUTANEOUS at 21:04

## 2019-09-03 RX ADMIN — INSULIN GLARGINE 12 UNIT(S): 100 INJECTION, SOLUTION SUBCUTANEOUS at 21:03

## 2019-09-03 RX ADMIN — LEVETIRACETAM 400 MILLIGRAM(S): 250 TABLET, FILM COATED ORAL at 03:47

## 2019-09-03 RX ADMIN — Medication 2: at 16:47

## 2019-09-03 RX ADMIN — Medication 6: at 21:03

## 2019-09-03 NOTE — PROGRESS NOTE ADULT - PROBLEM SELECTOR PLAN 7
F: none  E: replete electrolytes K< 4, Mg <2  N: DASH/TLC diet  DVT PPx: Lovenox  Code status: Full Code    Dispo: [facility]  Code: FULL F: none  E: replete electrolytes K< 4, Mg <2  N: diabetic diet   DVT PPx: Lovenox  Code status: Full Code    Dispo: [facility] On admission Hgb 13.76. Hemodynamically stable. No signs or symptoms of bleeding.   - Hgb today 12.2, likely dilutional s/p **LR  - continue to closely monitor CBC

## 2019-09-03 NOTE — PROGRESS NOTE ADULT - ASSESSMENT
Mr Perez is a 58-year-old man who presented on 9/1/19 with status epilepticus (4 seizures within a 1-2 hour window without return to baseline in between), likely provoked by hyperglycemia and metabolic acidosis, now resolved.  Patient denies any history of seizures and current EEG shows no focal abnormalities, epileptiform discharges, or seizures.  Thus I do not suspect that he has underlying epilepsy but rather the seizures were provoked by hyperglycemia and metabolic derangement, and therefore would not recommend continuing long-term AED therapy.    # Uncontrolled diabetes c/b hyperglycemic state and status epilepticus, now resolved    Recommendations:  - D/C Keppra now  -Continue vEEG for 24hrs  -Diabetes management per primary team  - Acute kidney injury management as per primary team  -Will follow Mr Perez is a 58-year-old man who presented on 9/1/19 with status epilepticus (4 seizures within a 1-2 hour window without return to baseline in between), likely provoked by hyperglycemia and metabolic acidosis, now resolved.  Patient denies any history of seizures and current EEG shows no focal abnormalities, epileptiform discharges, or seizures.  Thus I do not suspect that he has underlying epilepsy but rather the seizures were provoked by hyperglycemia and metabolic derangement, and therefore would not recommend continuing long-term AED therapy.    # Uncontrolled diabetes c/b hyperglycemic state and status epilepticus, now resolved    Recommendations:  - D/C Keppra now  - Continue vEEG monitoring for 24hrs  - Diabetes management per primary team  - Acute kidney injury management as per primary team  - Maintain seizure and fall precautions  - Will continue to follow

## 2019-09-03 NOTE — PROGRESS NOTE ADULT - PROBLEM SELECTOR PLAN 3
On admission BUN/Cr 13/0.93.   -BUN/Cr increased to 21/2.74 on 9/2, Ulytes indicate pre-renal etiology   -BUN/Cr remain elevated 23/2.71 s/p LR bolus today   -Urine output is adequate   -If urine output decreases consider another bolus of LR  -CK wnl On admission BUN/Cr 13/0.93.   -BUN/Cr increased to 21/2.74 on 9/2, Ulytes indicate pre-renal etiology   -BUN/Cr remain elevated 23/2.71 s/p 500cc LR bolus  -Urine output is adequate   -If urine output decreases consider another bolus of LR  -CK wnl  -continue to trend BMP On admission BUN/Cr 13/0.93.   -BUN/Cr increased to 21/2.74 on 9/2, Ulytes indicate pre-renal etiology   -BUN/Cr remain elevated 23/2.71 s/p 500cc LR bolus  -Urine output is adequate   -start maintenance LR overnight   -CK wnl  -continue to trend BMP

## 2019-09-03 NOTE — PROGRESS NOTE ADULT - ASSESSMENT
58 yom with hx of drug abuse and DM with no history of epilepsy presenting to [facility] obtunded s/p a witnessed tonic-clonic seizure. Patient proceeded to experience status epilepticus requiring intubation.     PLAN    NEURO:  #Status epilepticus  -No known seizure history as per patient   -Keppra stopped after this am's dose per neuro  -C/w EEG for another 24h, so far no epilepsy events have been noted on the EEG   -Determining other possible etiologies for seizures: Infection (UA shows no infection, BCx NGTD), Drugs (Utox positive for opioids and methadone), Metabolic derangement (ABG showing mild resp alkalosis, Negative BHB, UA showing no ketones)  -Patient states he takes 4mg of methadone Q2 days   -Remains AOx3       PULM:  #No active issues      CV:  #No active issues      ENDO:  #Elevated blood sugars, resolved   -Negative BHB   -A1C of 12.4  -Lispro SS  -12 U Lantus at bedtime   -Home meds include: jenuvia and metformin      RENAL:  #MELI  -Cr remains elevated  -Urine output is adequate   -If urine output decreases consider another bolus of LR  -Ulytes ordered, indicate pre-renal   -CK wnl     #Hyperchloremic non-anion gap metabolic acidosis likely 2/2 to initial fluid resuscitation w/ NS  -Any future boluses will be LR   -Will monitor BMP      GENERAL:  #Drug abuse  -On methadone 4mg q2 days  -Admits to recent heroin use   -Will f/u with methadone clinic today       F: None   E: Replete as necessary  N: NPO/None   Ppx: GI-none, DVT-Lovenox     CODE: FULL    DISPO: MICU

## 2019-09-03 NOTE — PROGRESS NOTE ADULT - ASSESSMENT
57 yo M with PMHx of drug abuse and DM with no history of epilepsy presenting to [facility] obtunded s/p a witnessed tonic-clonic seizure. Patient proceeded to experience status epilepticus requiring intubation. Now extubated and stable to be transferred to [facility].

## 2019-09-03 NOTE — PROGRESS NOTE ADULT - PROBLEM SELECTOR PLAN 5
-On methadone 4mg q2 days  -Admits to recent heroin use   -Will f/u with methadone clinic for collateral information Patient with no known history of HTN.   -BP today high 150s/80s  -consider starting Amlodipine  -closely monitor BP  -sodium restricted diet

## 2019-09-03 NOTE — DIETITIAN INITIAL EVALUATION ADULT. - ADD RECOMMEND
1. Encourage PO intake 2. Monitor lytes and replete prn. POC BG q6hrs 3. Pain and bowel regimens per team 4. Reinforce diet education

## 2019-09-03 NOTE — PROGRESS NOTE ADULT - PROBLEM SELECTOR PLAN 2
Patient has a hx of DM Type II. Home meds include: jenuvia and metformin  -Negative BHB   -HbA1C of 12.4  -Lispro SS  -12 U Lantus at bedtime Patient has a hx of DM Type II. Home meds include: jenuvia and metformin  -Negative BHB   -HbA1C of 12.4  -Lispro SS  -12 U Lantus at bedtime  -endo consult pending

## 2019-09-03 NOTE — EEG REPORT - NS EEG TEXT BOX
Continuous EEG Report  9/2/2019 @ 7 AM to 9/3/2019 @ 7 AM:     Pertinent medications: Keppra 1g BID  Background unchanged prior to sedation wean.  Following sedation wean and extubation at ~11 AM, the background was continuous, composed of alpha > theta frequencies with a symmetric 8 Hz posterior dominant rhythm.  No persistent focal asymmetries.  No epileptiform discharges, seizures, or significant clinical events.    Daily Summary:    1)	Mild generalized background slowing, indicating diffuse or multifocal cerebral dysfunction.  2)	No seizures or significant clinical events.  Read by: Mag Diamond MD

## 2019-09-03 NOTE — DIETITIAN INITIAL EVALUATION ADULT. - OTHER INFO
59 yo/male with PMHx DM, prior drug use now on methadone and in drug rehab program, [place]. Pt was obtunded on presentation and was intubated 2/2 status epilepticus and c/f inability to protect airway. Pt successfully extubated on 9/2. EEG reading negative for seizures. Seizure possibly induced by hyperglycemia. Pt seen in room and discussed during MICU rounds. Pt awake, alert, very pleasant. He endorses usually having a good appetite, though is provided most of his food through rehab program, which often times consists of refined carbs. He reports that sometimes he will go out and buy something different but can be difficult. Does like chicken, fish, eggs too. He reports compliance w/oral DM meds. He endorsed ~60% intake at lunch today with good tolerance. No N/V/C/D reported at this time. +BM. NKFA. Denies difficulty chewing or swallowing. Skin intact pressure-wise. No pain endorsed. DM diet ed reviewed; pt hopes to be out of rehab program soon and then will have better control over sugars.

## 2019-09-03 NOTE — PROGRESS NOTE ADULT - PROBLEM SELECTOR PLAN 6
On admission Hgb 13.76. Hemodynamically stable. No signs or symptoms of bleeding.   - Hgb today 12.2, likely dilutional s/p **LR  - continue to closely monitor CBC -On methadone 4mg q2 days  -Admits to recent heroin use   -Will f/u with methadone clinic for collateral information

## 2019-09-03 NOTE — PROGRESS NOTE ADULT - PROBLEM SELECTOR PLAN 8
1) PCP Contacted on Admission: (Y/N) --> Name & Phone #:  2) Date of Contact with PCP: TBD  3) PCP Contacted at Discharge: TBD  4) Summary of Handoff Given to PCP: TBD   5) Post-Discharge Appointment Date: TBD F: none  E: replete electrolytes K< 4, Mg <2  N: diabetic diet   DVT PPx: Lovenox  Code status: Full Code    Dispo: [facility]

## 2019-09-03 NOTE — PROGRESS NOTE ADULT - SUBJECTIVE AND OBJECTIVE BOX
SHAHANA RAMIRES  58y  Male    Patient is a 58y old  Male who presents with a chief complaint of Status epilepticus (03 Sep 2019 10:29)      HOSPITAL COURSE:  58 yom with pmhx of opioid abuse on methadone and DM presenting with a single episode of [place] train (no known hx of epilepsy). Upon arrival to the ED the patient experienced an episode status epilepticus requiring intubation and sedation for airway management. Initially it was not known if the patient had a hx of epilepsy so different etiologies were explored. Blood sugars were elevated into the 370s upon admission but BHB was negative adn there were no ketones in the blood. Patient also came in with a non-anion gap metabolic acidosis. UTOX positive for methadone and opioids. Zofran was given in the ED with no change in patient's condition. Patient remained sedated and intubated for one day before a successful extubation. Upon awakening the patient informed us of having no hx of epilepsy and that he is on 4mg of methadone every other day. The patient's Cr also jumped up to 2.71 with decreased urine output urine output resolved with fluid bolus, the Cr reamines elevated. The patient's mental status returned to AOx3 and neuro exam was grossly normal. Patient underwent 24h EEG, part of it while he was still sedated. The EEG will be continued for an additional 24h to the morning of  for further evaluation. Neurology stopped the keppra as of the am of 9/3.     INTERVAL HPI/OVERNIGHT EVENTS: No acute events overnight, patient urine output remained adequate. This morning the patient's only complaint was a little bit of lightheadedness, but denies HA, CP, SOB, abdominal pain, dizziness or any weakness or numbness.     REVIEW OF SYSTEMS:  CONSTITUTIONAL: No fever or fatigue  EYES: No eye pain, visual disturbances  RESPIRATORY: No cough, wheezing, chills or hemoptysis; No shortness of breath  CARDIOVASCULAR: No chest pain, palpitations, dizziness, or leg swelling  GASTROINTESTINAL: No abdominal or epigastric pain. No nausea, vomiting  NEUROLOGICAL: No headaches, memory loss, loss of strength, numbness, or tremors      T(C): 36.1 (19 @ 10:26), Max: 37.2 (19 @ 22:33)  HR: 60 (19 @ 11:00) (58 - 86)  BP: 159/83 (19 @ 11:00) (122/71 - 161/72)  RR: 15 (19 @ 11:00) (12 - 27)  SpO2: 98% (19 @ 11:00) (95% - 100%)  Wt(kg): --Vital Signs Last 24 Hrs  T(C): 36.1 (03 Sep 2019 10:26), Max: 37.2 (02 Sep 2019 22:33)  T(F): 97 (03 Sep 2019 10:26), Max: 98.9 (02 Sep 2019 22:33)  HR: 60 (03 Sep 2019 11:00) (58 - 86)  BP: 159/83 (03 Sep 2019 11:00) (122/71 - 161/72)  BP(mean): 104 (03 Sep 2019 11:00) (90 - 126)  RR: 15 (03 Sep 2019 11:00) (12 - 27)  SpO2: 98% (03 Sep 2019 11:00) (95% - 100%)    PHYSICAL EXAM:  GENERAL: NAD  NERVOUS SYSTEM:  Alert & Oriented X3; Motor Strength 5/5 B/L upper and lower extremities; CN II-XII intact   CHEST/LUNG: Clear to auscultation bilaterally; No rales, rhonchi, wheezing, or rubs  HEART: Regular rate and rhythm; No murmurs, rubs, or gallops  ABDOMEN: Soft, Nontender, Nondistended; Bowel sounds present  EXTREMITIES:  WWP, No clubbing, cyanosis, or edema  Vascular: 2+ peripheral pulses x4  SKIN: No rashes or lesions    Consultant(s) Notes Reviewed:  [x ] YES  [ ] NO  Care Discussed with Consultants/Other Providers [ x] YES  [ ] NO    LABS:                        12.2   9.24  )-----------( 227      ( 03 Sep 2019 06:54 )             38.0     09-    142  |  114<H>  |  23  ----------------------------<  140<H>  3.9   |  18<L>  |  2.71<H>    Ca    8.3<L>      03 Sep 2019 06:54  Phos  4.1     -  Mg     2.4     -03          Urinalysis Basic - ( 01 Sep 2019 17:34 )    Color: Yellow / Appearance: SL Cloudy / S.010 / pH: x  Gluc: x / Ketone: NEGATIVE  / Bili: Negative / Urobili: 0.2 E.U./dL   Blood: x / Protein: 30 mg/dL / Nitrite: NEGATIVE   Leuk Esterase: NEGATIVE / RBC: > 10 /HPF / WBC 5-10 /HPF   Sq Epi: x / Non Sq Epi: 0-5 /HPF / Bacteria: Present /HPF      CAPILLARY BLOOD GLUCOSE      POCT Blood Glucose.: 204 mg/dL (03 Sep 2019 11:11)  POCT Blood Glucose.: 132 mg/dL (03 Sep 2019 06:58)  POCT Blood Glucose.: 196 mg/dL (02 Sep 2019 21:10)  POCT Blood Glucose.: 114 mg/dL (02 Sep 2019 16:34)      ABG - ( 01 Sep 2019 15:00 )  pH, Arterial: 7.42  pH, Blood: x     /  pCO2: 32    /  pO2: 116   / HCO3: 20    / Base Excess: -3.2  /  SaO2: 98                Urinalysis Basic - ( 01 Sep 2019 17:34 )    Color: Yellow / Appearance: SL Cloudy / S.010 / pH: x  Gluc: x / Ketone: NEGATIVE  / Bili: Negative / Urobili: 0.2 E.U./dL   Blood: x / Protein: 30 mg/dL / Nitrite: NEGATIVE   Leuk Esterase: NEGATIVE / RBC: > 10 /HPF / WBC 5-10 /HPF   Sq Epi: x / Non Sq Epi: 0-5 /HPF / Bacteria: Present /HPF        RADIOLOGY & ADDITIONAL TESTS:    Imaging Personally Reviewed:  [ ] YES  [ ] NO    HEALTH ISSUES - PROBLEM Dx:

## 2019-09-03 NOTE — PROGRESS NOTE ADULT - SUBJECTIVE AND OBJECTIVE BOX
Transfer note from MICU to [facility]     Hospital course:   57 yo M with pmhx of opioid abuse on methadone and DM Type II presenting with a single episode of [place] train (no known hx of epilepsy). Upon arrival to the ED the patient experienced an episode status epilepticus requiring intubation and sedation for airway management.  Blood sugars were elevated into the 370s upon admission but BHB was negative adn there were no ketones in the blood. Patient also came in with a non-anion gap metabolic acidosis. UTOX positive for methadone and opioids. Zofran was given in the ED with no change in patient's condition. Patient remained sedated and intubated for one day before a successful extubation. Upon awakening the patient informed us of having no hx of epilepsy and that he is on 4mg of methadone every other day. The patient's Cr also jumped up to 2.71 with decreased urine output urine output resolved with fluid bolus, the Cr reamines elevated. The patient's mental status returned to AOx3 and neuro exam was grossly normal. Patient underwent 24h EEG, part of it while he was still sedated. The EEG will be continued for an additional 24h to the morning of 9/4 for further evaluation. Neurology stopped the keppra as of the am of 9/3. Patient is stable for transfer to [facility] for further management.       SUBJECTIVE / INTERVAL HPI: Patient seen and examined at bedside. No acute complaints. Denies HA, CP, SOB, abdominal pain, dizziness or any weakness or numbness.     VITAL SIGNS:  Vital Signs Last 24 Hrs  T(C): 37.2 (03 Sep 2019 18:35), Max: 37.2 (02 Sep 2019 22:33)  T(F): 99 (03 Sep 2019 18:35), Max: 99 (03 Sep 2019 18:35)  HR: 65 (03 Sep 2019 20:21) (58 - 82)  BP: 166/85 (03 Sep 2019 20:21) (122/71 - 166/85)  BP(mean): 119 (03 Sep 2019 20:21) (92 - 126)  RR: 18 (03 Sep 2019 20:21) (12 - 27)  SpO2: 97% (03 Sep 2019 20:21) (95% - 100%)    09-02-19 @ 07:01  -  09-03-19 @ 07:00  --------------------------------------------------------  IN: 683.8 mL / OUT: 1325 mL / NET: -641.2 mL    09-03-19 @ 07:01  -  09-03-19 @ 21:20  --------------------------------------------------------  IN: 0 mL / OUT: 1200 mL / NET: -1200 mL      PHYSICAL EXAM:    General: WDWN  HEENT: NC/AT; PERRL, anicteric sclera; MMM  Neck: supple  Cardiovascular: +S1/S2; RRR  Respiratory: CTA B/L; no W/R/R  Gastrointestinal: soft, NT/ND; +BSx4  Extremities: WWP; no edema, clubbing or cyanosis  Vascular: 2+ radial, DP/PT pulses B/L  Neurological: AAOx3; no focal deficits    MEDICATIONS:  MEDICATIONS  (STANDING):  dextrose 5%. 1000 milliLiter(s) (50 mL/Hr) IV Continuous <Continuous>  dextrose 50% Injectable 12.5 Gram(s) IV Push once  dextrose 50% Injectable 25 Gram(s) IV Push once  dextrose 50% Injectable 25 Gram(s) IV Push once  enoxaparin Injectable 40 milliGRAM(s) SubCutaneous every 24 hours  insulin glargine Injectable (LANTUS) 12 Unit(s) SubCutaneous at bedtime  insulin lispro (HumaLOG) corrective regimen sliding scale   SubCutaneous Before meals and at bedtime    MEDICATIONS  (PRN):  dextrose 40% Gel 15 Gram(s) Oral once PRN Blood Glucose LESS THAN 70 milliGRAM(s)/deciliter  glucagon  Injectable 1 milliGRAM(s) IntraMuscular once PRN Glucose LESS THAN 70 milligrams/deciliter      ALLERGIES:  Allergies    Allergy Status Unknown    Intolerances        LABS:                        12.2   9.24  )-----------( 227      ( 03 Sep 2019 06:54 )             38.0     09-03    142  |  114<H>  |  23  ----------------------------<  140<H>  3.9   |  18<L>  |  2.71<H>    Ca    8.3<L>      03 Sep 2019 06:54  Phos  4.1     09-03  Mg     2.4     09-03    CAPILLARY BLOOD GLUCOSE    POCT Blood Glucose.: 258 mg/dL (03 Sep 2019 20:58)    RADIOLOGY & ADDITIONAL TESTS: Reviewed. Transfer note from MICU to [facility]     Hospital course:   57 yo M with pmhx of opioid abuse on methadone and DM Type II presenting with a single episode [place] train (no known hx of epilepsy). Upon arrival to the ED the patient experienced an episode status epilepticus requiring intubation and sedation for airway management.  Blood sugars were elevated into the 370s upon admission but BHB was negative adn there were no ketones in the blood. Patient also came in with a non-anion gap metabolic acidosis. UTOX positive for methadone and opioids. Zofran was given in the ED with no change in patient's condition. Patient remained sedated and intubated for one day before a successful extubation. Upon awakening the patient informed us of having no hx of epilepsy and that he is on 4mg of methadone every other day. The patient's Cr also jumped up to 2.71 with decreased urine output urine output resolved with fluid bolus, the Cr reamines elevated. The patient's mental status returned to AOx3 and neuro exam was grossly normal. Patient underwent 24h EEG, part of it while he was still sedated. The EEG will be continued for an additional 24h to the morning of 9/4 for further evaluation. Neurology stopped the keppra as of the am of 9/3. Patient is stable for transfer to [facility] for further management.       SUBJECTIVE / INTERVAL HPI: Patient seen and examined at bedside. No acute complaints. Denies HA, CP, SOB, abdominal pain, dizziness or any weakness or numbness.     VITAL SIGNS:  Vital Signs Last 24 Hrs  T(C): 37.2 (03 Sep 2019 18:35), Max: 37.2 (02 Sep 2019 22:33)  T(F): 99 (03 Sep 2019 18:35), Max: 99 (03 Sep 2019 18:35)  HR: 65 (03 Sep 2019 20:21) (58 - 82)  BP: 166/85 (03 Sep 2019 20:21) (122/71 - 166/85)  BP(mean): 119 (03 Sep 2019 20:21) (92 - 126)  RR: 18 (03 Sep 2019 20:21) (12 - 27)  SpO2: 97% (03 Sep 2019 20:21) (95% - 100%)    09-02-19 @ 07:01  -  09-03-19 @ 07:00  --------------------------------------------------------  IN: 683.8 mL / OUT: 1325 mL / NET: -641.2 mL    09-03-19 @ 07:01  -  09-03-19 @ 21:20  --------------------------------------------------------  IN: 0 mL / OUT: 1200 mL / NET: -1200 mL      PHYSICAL EXAM:    General: WDWN. In no acute distress.   HEENT: NC/AT; PERRL, anicteric sclera; MMM  Neck: supple  Cardiovascular: +S1/S2; RRR  Respiratory: CTA B/L; no W/R/R  Gastrointestinal: soft, NT/ND; +BSx4  Extremities: WWP; no edema, clubbing or cyanosis  Vascular: 2+ radial, DP/PT pulses B/L  Neurological:   	- Mental Status: AAOx3; speech is fluent with intact naming, repetition, and comprehension  	- Cranial Nerves II -XII:  II: PERRLA; visual fields are full to confrontation  III, IV, VI: EOMI, no nystagmus appreciated  V: facial sensation intact to light touch V1-V3 b/l  VII: no ptosis, no facial droop, symmetric eyebrow raise and closure  VIII: hearing intact to finger rub b/l  IX, X: uvula is midline, soft palate rises symmetrically  XI: head turning and shoulder shrug intact b/l  XII: tongue protrusion midline  - Motor: strength is 5/5 b/l LLE & RLE, 5/5 b/l LUE & RUE. normal muscle bulk and tone throughout, no pronator drift  -Sensation: Intact to light touch, proprioception, and pinprick.  No neglect.   -Coordination: No dysmetria on finger-to-nose and heel-to-shin.  No clumsiness.  -Reflexes: 2+ in upper and lower extremities, downgoing toes bilaterally  -Gait: not assessed    MEDICATIONS:  MEDICATIONS  (STANDING):  dextrose 5%. 1000 milliLiter(s) (50 mL/Hr) IV Continuous <Continuous>  dextrose 50% Injectable 12.5 Gram(s) IV Push once  dextrose 50% Injectable 25 Gram(s) IV Push once  dextrose 50% Injectable 25 Gram(s) IV Push once  enoxaparin Injectable 40 milliGRAM(s) SubCutaneous every 24 hours  insulin glargine Injectable (LANTUS) 12 Unit(s) SubCutaneous at bedtime  insulin lispro (HumaLOG) corrective regimen sliding scale   SubCutaneous Before meals and at bedtime    MEDICATIONS  (PRN):  dextrose 40% Gel 15 Gram(s) Oral once PRN Blood Glucose LESS THAN 70 milliGRAM(s)/deciliter  glucagon  Injectable 1 milliGRAM(s) IntraMuscular once PRN Glucose LESS THAN 70 milligrams/deciliter      ALLERGIES:  Allergies    Allergy Status Unknown    Intolerances        LABS:                        12.2   9.24  )-----------( 227      ( 03 Sep 2019 06:54 )             38.0     09-03    142  |  114<H>  |  23  ----------------------------<  140<H>  3.9   |  18<L>  |  2.71<H>    Ca    8.3<L>      03 Sep 2019 06:54  Phos  4.1     09-03  Mg     2.4     09-03    CAPILLARY BLOOD GLUCOSE    POCT Blood Glucose.: 258 mg/dL (03 Sep 2019 20:58)    RADIOLOGY & ADDITIONAL TESTS: Reviewed. Transfer note from MICU to [facility]     Hospital course:   59 yo M with pmhx of opioid abuse on methadone and DM Type II presenting with a single [place] (no known hx of epilepsy). Upon arrival to the ED the patient experienced an episode status epilepticus requiring intubation and sedation for airway management.  Blood sugars were elevated into the 370s upon admission but BHB was negative and there were no ketones in the blood. Patient also came in with a non-anion gap metabolic acidosis. UTOX positive for methadone and opioids. Zofran was given in the ED with no change in patient's condition. Patient remained sedated and intubated for one day before a successful extubation. Upon awakening the patient informed us of having no hx of epilepsy and that he is on 4mg of methadone every other day. The patient's Cr also jumped up to 2.71 with decreased urine output. Urine output resolved with fluid bolus, the Cr reamines elevated. The patient's mental status returned to AOx3 and neuro exam was grossly normal. Patient underwent 24h EEG, part of it while he was still sedated. The EEG will be continued for an additional 24h to the morning of 9/4 for further evaluation. Neurology stopped the keppra as of the am of 9/3. Patient is stable for transfer to [facility] for further management.       SUBJECTIVE / INTERVAL HPI: Patient seen and examined at bedside. No acute complaints. Denies HA, CP, SOB, abdominal pain, dizziness or any weakness or numbness.     VITAL SIGNS:  Vital Signs Last 24 Hrs  T(C): 37.2 (03 Sep 2019 18:35), Max: 37.2 (02 Sep 2019 22:33)  T(F): 99 (03 Sep 2019 18:35), Max: 99 (03 Sep 2019 18:35)  HR: 65 (03 Sep 2019 20:21) (58 - 82)  BP: 166/85 (03 Sep 2019 20:21) (122/71 - 166/85)  BP(mean): 119 (03 Sep 2019 20:21) (92 - 126)  RR: 18 (03 Sep 2019 20:21) (12 - 27)  SpO2: 97% (03 Sep 2019 20:21) (95% - 100%)    09-02-19 @ 07:01  -  09-03-19 @ 07:00  --------------------------------------------------------  IN: 683.8 mL / OUT: 1325 mL / NET: -641.2 mL    09-03-19 @ 07:01  -  09-03-19 @ 21:20  --------------------------------------------------------  IN: 0 mL / OUT: 1200 mL / NET: -1200 mL      PHYSICAL EXAM:    General: WDWN. In no acute distress.   HEENT: NC/AT; PERRL, anicteric sclera; MMM  Neck: supple  Cardiovascular: +S1/S2; RRR  Respiratory: CTA B/L; no W/R/R  Gastrointestinal: soft, NT/ND; +BSx4  Extremities: WWP; no edema, clubbing or cyanosis  Vascular: 2+ radial, DP/PT pulses B/L  Neurological:   	- Mental Status: AAOx3; speech is fluent with intact naming, repetition, and comprehension  	- Cranial Nerves II -XII:  II: PERRLA; visual fields are full to confrontation  III, IV, VI: EOMI, no nystagmus appreciated  V: facial sensation intact to light touch V1-V3 b/l  VII: no ptosis, no facial droop, symmetric eyebrow raise and closure  VIII: hearing intact to finger rub b/l  IX, X: uvula is midline, soft palate rises symmetrically  XI: head turning and shoulder shrug intact b/l  XII: tongue protrusion midline  - Motor: strength is 5/5 b/l LLE & RLE, 5/5 b/l LUE & RUE. normal muscle bulk and tone throughout, no pronator drift  -Sensation: Intact to light touch, proprioception, and pinprick.  No neglect.   -Coordination: No dysmetria on finger-to-nose and heel-to-shin.  No clumsiness.  -Reflexes: 2+ in upper and lower extremities, downgoing toes bilaterally  -Gait: not assessed    MEDICATIONS:  MEDICATIONS  (STANDING):  dextrose 5%. 1000 milliLiter(s) (50 mL/Hr) IV Continuous <Continuous>  dextrose 50% Injectable 12.5 Gram(s) IV Push once  dextrose 50% Injectable 25 Gram(s) IV Push once  dextrose 50% Injectable 25 Gram(s) IV Push once  enoxaparin Injectable 40 milliGRAM(s) SubCutaneous every 24 hours  insulin glargine Injectable (LANTUS) 12 Unit(s) SubCutaneous at bedtime  insulin lispro (HumaLOG) corrective regimen sliding scale   SubCutaneous Before meals and at bedtime    MEDICATIONS  (PRN):  dextrose 40% Gel 15 Gram(s) Oral once PRN Blood Glucose LESS THAN 70 milliGRAM(s)/deciliter  glucagon  Injectable 1 milliGRAM(s) IntraMuscular once PRN Glucose LESS THAN 70 milligrams/deciliter      ALLERGIES:  Allergies    Allergy Status Unknown    Intolerances        LABS:                        12.2   9.24  )-----------( 227      ( 03 Sep 2019 06:54 )             38.0     09-03    142  |  114<H>  |  23  ----------------------------<  140<H>  3.9   |  18<L>  |  2.71<H>    Ca    8.3<L>      03 Sep 2019 06:54  Phos  4.1     09-03  Mg     2.4     09-03    CAPILLARY BLOOD GLUCOSE    POCT Blood Glucose.: 258 mg/dL (03 Sep 2019 20:58)    RADIOLOGY & ADDITIONAL TESTS: Reviewed.

## 2019-09-03 NOTE — PROGRESS NOTE ADULT - PROBLEM SELECTOR PLAN 4
Hyperchloremic non-anion gap metabolic acidosis likely 2/2 to initial fluid resuscitation w/ NS.   -Any future boluses will be LR   -Will monitor BMP Hyperchloremic non-anion gap metabolic acidosis likely 2/2 to initial fluid resuscitation w/ 2L NS   -resolved  -Will monitor BMP Hyperchloremic non-anion gap metabolic acidosis likely 2/2 to initial fluid resuscitation w/ 2L NS   -resolved  -Will monitor BMP    ADDENDUM: recurring non AG metabolic acidosis, restarted LR on RMF

## 2019-09-03 NOTE — PROGRESS NOTE ADULT - SUBJECTIVE AND OBJECTIVE BOX
Subjective  No events overnight. No complaints currently.    ROS  As above, otherwise negative for constitutional/HEENT/CV/pulm/GI//MSK/neuro/derm/endocrine/psych.     MEDICATIONS  (STANDING):  dextrose 5%. 1000 milliLiter(s) (50 mL/Hr) IV Continuous <Continuous>  dextrose 50% Injectable 12.5 Gram(s) IV Push once  dextrose 50% Injectable 25 Gram(s) IV Push once  dextrose 50% Injectable 25 Gram(s) IV Push once  enoxaparin Injectable 40 milliGRAM(s) SubCutaneous every 24 hours  insulin glargine Injectable (LANTUS) 12 Unit(s) SubCutaneous at bedtime  insulin lispro (HumaLOG) corrective regimen sliding scale   SubCutaneous Before meals and at bedtime  levETIRAcetam  IVPB 1000 milliGRAM(s) IV Intermittent every 12 hours    MEDICATIONS  (PRN):  dextrose 40% Gel 15 Gram(s) Oral once PRN Blood Glucose LESS THAN 70 milliGRAM(s)/deciliter  glucagon  Injectable 1 milliGRAM(s) IntraMuscular once PRN Glucose LESS THAN 70 milligrams/deciliter      T(C): 36.1 (09-03-19 @ 10:26), Max: 37.2 (09-02-19 @ 22:33)  HR: 62 (09-03-19 @ 10:00) (58 - 90)  BP: 155/80 (09-03-19 @ 10:00) (121/61 - 161/72)  RR: 17 (09-03-19 @ 10:00) (12 - 27)  SpO2: 95% (09-03-19 @ 10:00) (95% - 100%)  Wt(kg): --    Eyes open spontaneously. Conversational with appropriate sentences.  EOMI. Visual fields full. PERRL 3>2. Face symmetrical.  Full strength throughout.  Finger-nose-finger intact R/L.  Intact to light touch throughout.    CBC Full  -  ( 03 Sep 2019 06:54 )  WBC Count : 9.24 K/uL  RBC Count : 4.14 M/uL  Hemoglobin : 12.2 g/dL  Hematocrit : 38.0 %  Platelet Count - Automated : 227 K/uL  Mean Cell Volume : 91.8 fl  Mean Cell Hemoglobin : 29.5 pg  Mean Cell Hemoglobin Concentration : 32.1 gm/dL  Auto Neutrophil # : 6.63 K/uL  Auto Lymphocyte # : 1.54 K/uL  Auto Monocyte # : 0.81 K/uL  Auto Eosinophil # : 0.17 K/uL  Auto Basophil # : 0.03 K/uL  Auto Neutrophil % : 71.8 %  Auto Lymphocyte % : 16.7 %  Auto Monocyte % : 8.8 %  Auto Eosinophil % : 1.8 %  Auto Basophil % : 0.3 %    09-03    142  |  114<H>  |  23  ----------------------------<  140<H>  3.9   |  18<L>  |  2.71<H>    Ca    8.3<L>      03 Sep 2019 06:54  Phos  4.1     09-03  Mg     2.4     09-03    TPro  6.6  /  Alb  4.1  /  TBili  0.4  /  DBili  x   /  AST  10  /  ALT  11  /  AlkPhos  84  09-01    LIVER FUNCTIONS - ( 01 Sep 2019 11:38 )  Alb: 4.1 g/dL / Pro: 6.6 g/dL / ALK PHOS: 84 U/L / ALT: 11 U/L / AST: 10 U/L / GGT: x Subjective  Mr Perez reports that he is feeling "90% better today." No seizures reported overnight. Patient report headache, and dizziness 3days prior to the seizures, and has now resolved. Elevated creatinine 2.71 today with slight improvement after fluid bolus yesterday.     Review of Systems:  CONSTITUTIONAL:  No weight loss, fever, chills, weakness or fatigue.  HEENT:  Eyes:  No visual loss, blurred vision, double vision or yellow sclerae. Ears, Nose, Throat:  No hearing loss, sneezing, congestion, runny nose or sore throat.  SKIN:  No rash or itching.  CARDIOVASCULAR:  No chest pain, chest pressure or chest discomfort. No palpitations or edema.  RESPIRATORY:  No shortness of breath, cough or sputum.  GASTROINTESTINAL:  No anorexia, nausea, vomiting or diarrhea. No abdominal pain or blood.  GENITOURINARY: No Burning on urination.   NEUROLOGICAL:  see HPI  MUSCULOSKELETAL:  No muscle, back pain, joint pain or stiffness.  HEMATOLOGIC:  No anemia, bleeding or bruising.  LYMPHATICS:  No enlarged nodes. No history of splenectomy.  PSYCHIATRIC:  No history of depression or anxiety.  ENDOCRINOLOGIC:  No reports of sweating, cold or heat intolerance. No polyuria or polydipsia.  ALLERGIES:  No history of asthma, hives, eczema or rhinitis.     MEDICATIONS  (STANDING):  dextrose 5%. 1000 milliLiter(s) (50 mL/Hr) IV Continuous <Continuous>  dextrose 50% Injectable 12.5 Gram(s) IV Push once  dextrose 50% Injectable 25 Gram(s) IV Push once  dextrose 50% Injectable 25 Gram(s) IV Push once  enoxaparin Injectable 40 milliGRAM(s) SubCutaneous every 24 hours  insulin glargine Injectable (LANTUS) 12 Unit(s) SubCutaneous at bedtime  insulin lispro (HumaLOG) corrective regimen sliding scale   SubCutaneous Before meals and at bedtime  levETIRAcetam  IVPB 1000 milliGRAM(s) IV Intermittent every 12 hours    MEDICATIONS  (PRN):  dextrose 40% Gel 15 Gram(s) Oral once PRN Blood Glucose LESS THAN 70 milliGRAM(s)/deciliter  glucagon  Injectable 1 milliGRAM(s) IntraMuscular once PRN Glucose LESS THAN 70 milligrams/deciliter      T(C): 36.1 (09-03-19 @ 10:26), Max: 37.2 (09-02-19 @ 22:33)  HR: 62 (09-03-19 @ 10:00) (58 - 90)  BP: 155/80 (09-03-19 @ 10:00) (121/61 - 161/72)  RR: 17 (09-03-19 @ 10:00) (12 - 27)  SpO2: 95% (09-03-19 @ 10:00) (95% - 100%)  Wt(kg): --    General Adult Exam  GENERAL APPEARANCE: Patient appears to be in no acute distress.  EYES: PERRLA 2mm brisk, EOMI. vision is grossly intact.  EARS: External auditory canals and tympanic membranes clear, hearing grossly intact.  NOSE: No nasal discharge.  CARDIAC: Normal S1 and S2. No S3, S4 or murmurs. Rhythm is regular. There is no peripheral edema, cyanosis or pallor. Extremities are warm and well perfused. Capillary refill is less than 2 seconds. No carotid bruits.  LUNGS: Clear to auscultation   ABDOMEN: Positive bowel sounds. Soft, nondistended, nontender. No guarding or rebound. No masses.  MUSCULOSKELETAL: Normal muscular development.   EXTREMITIES: No significant deformity or joint abnormality. No edema. Peripheral pulses intact. No varicosities.  SKIN: Skin normal color, texture and turgor with no lesions or eruptions.    Neurological Exam:  Mental Status: Alert and Orientated to self, date (month, unable to state year, say that's usual for him) and place.  Attention intact.  No dysarthria, aphasia or neglect.  Knowledge intact.  Registration intact.  Short and long term memory grossly intact.      Cranial Nerves: CN I - not tested.  PERRLA 2mm brisk, EOMI, VFF, no nystagmus or diplopia.  CN V1-3 intact to light touch.  No facial asymmetry.  Hearing intact to finger rub bilaterally.  Tongue, uvula and palate midline.  Sternocleidomastoid and Trapezius intact bilaterally.    Motor:   Tone: normal.                  Strength:     Moves all extremities equally, 5/5 strength  Pronator drift: none                 Dysmetria: None to finger-nose-finger   No truncal ataxia.    Tremor: No resting, postural or action tremor.  No myoclonus.    Sensation: intact to light touch  Deep Tendon Reflexes: 1+ bilateral biceps, triceps, brachioradialis, knee and ankle  Toes mute    Gait: Deferred      CBC Full  -  ( 03 Sep 2019 06:54 )  WBC Count : 9.24 K/uL  RBC Count : 4.14 M/uL  Hemoglobin : 12.2 g/dL  Hematocrit : 38.0 %  Platelet Count - Automated : 227 K/uL  Mean Cell Volume : 91.8 fl  Mean Cell Hemoglobin : 29.5 pg  Mean Cell Hemoglobin Concentration : 32.1 gm/dL  Auto Neutrophil # : 6.63 K/uL  Auto Lymphocyte # : 1.54 K/uL  Auto Monocyte # : 0.81 K/uL  Auto Eosinophil # : 0.17 K/uL  Auto Basophil # : 0.03 K/uL  Auto Neutrophil % : 71.8 %  Auto Lymphocyte % : 16.7 %  Auto Monocyte % : 8.8 %  Auto Eosinophil % : 1.8 %  Auto Basophil % : 0.3 %    09-03    142  |  114<H>  |  23  ----------------------------<  140<H>  3.9   |  18<L>  |  2.71<H>    Ca    8.3<L>      03 Sep 2019 06:54  Phos  4.1     09-03  Mg     2.4     09-03    TPro  6.6  /  Alb  4.1  /  TBili  0.4  /  DBili  x   /  AST  10  /  ALT  11  /  AlkPhos  84  09-01    LIVER FUNCTIONS - ( 01 Sep 2019 11:38 )  Alb: 4.1 g/dL / Pro: 6.6 g/dL / ALK PHOS: 84 U/L / ALT: 11 U/L / AST: 10 U/L / GGT: x Subjective  Mr Perez reports that he is feeling "90% better today." No seizures reported overnight. Patient report headache, and dizziness 3days prior to the seizures, and has now resolved. Elevated creatinine 2.71 today with slight improvement after fluid bolus yesterday.     Review of Systems:  CONSTITUTIONAL:  No weight loss, fever, chills, weakness or fatigue.  HEENT:  Eyes:  No visual loss, blurred vision, double vision or yellow sclerae. Ears, Nose, Throat:  No hearing loss, sneezing, congestion, runny nose or sore throat.  SKIN:  No rash or itching.  CARDIOVASCULAR:  No chest pain, chest pressure or chest discomfort. No palpitations or edema.  RESPIRATORY:  No shortness of breath, cough or sputum.  GASTROINTESTINAL:  No anorexia, nausea, vomiting or diarrhea. No abdominal pain or blood.  GENITOURINARY: No Burning on urination.   NEUROLOGICAL:  see HPI  MUSCULOSKELETAL:  No muscle, back pain, joint pain or stiffness.  HEMATOLOGIC:  No anemia, bleeding or bruising.  LYMPHATICS:  No enlarged nodes. No history of splenectomy.  PSYCHIATRIC:  No history of depression or anxiety.  ENDOCRINOLOGIC:  No reports of sweating, cold or heat intolerance. No polyuria or polydipsia.  ALLERGIES:  No history of asthma, hives, eczema or rhinitis.     MEDICATIONS  (STANDING):  dextrose 5%. 1000 milliLiter(s) (50 mL/Hr) IV Continuous <Continuous>  dextrose 50% Injectable 12.5 Gram(s) IV Push once  dextrose 50% Injectable 25 Gram(s) IV Push once  dextrose 50% Injectable 25 Gram(s) IV Push once  enoxaparin Injectable 40 milliGRAM(s) SubCutaneous every 24 hours  insulin glargine Injectable (LANTUS) 12 Unit(s) SubCutaneous at bedtime  insulin lispro (HumaLOG) corrective regimen sliding scale   SubCutaneous Before meals and at bedtime  levETIRAcetam  IVPB 1000 milliGRAM(s) IV Intermittent every 12 hours    MEDICATIONS  (PRN):  dextrose 40% Gel 15 Gram(s) Oral once PRN Blood Glucose LESS THAN 70 milliGRAM(s)/deciliter  glucagon  Injectable 1 milliGRAM(s) IntraMuscular once PRN Glucose LESS THAN 70 milligrams/deciliter      T(C): 36.1 (09-03-19 @ 10:26), Max: 37.2 (09-02-19 @ 22:33)  HR: 62 (09-03-19 @ 10:00) (58 - 90)  BP: 155/80 (09-03-19 @ 10:00) (121/61 - 161/72)  RR: 17 (09-03-19 @ 10:00) (12 - 27)  SpO2: 95% (09-03-19 @ 10:00) (95% - 100%)  Wt(kg): --    General Adult Exam  GENERAL APPEARANCE: Patient appears to be in no acute distress.  EYES: PERRLA 2mm brisk, EOMI. vision is grossly intact.  EARS: External auditory canals and tympanic membranes clear, hearing grossly intact.  NOSE: No nasal discharge.  CARDIAC: Normal S1 and S2. No S3, S4 or murmurs. Rhythm is regular. There is no peripheral edema, cyanosis or pallor. Extremities are warm and well perfused. Capillary refill is less than 2 seconds. No carotid bruits.  LUNGS: Clear to auscultation   ABDOMEN: Positive bowel sounds. Soft, nondistended, nontender. No guarding or rebound. No masses.  MUSCULOSKELETAL: Normal muscular development.   EXTREMITIES: No significant deformity or joint abnormality. No edema. Peripheral pulses intact. No varicosities.  SKIN: Skin normal color, texture and turgor with no lesions or eruptions.    Neurological Exam:  Mental Status: Alert and Orientated to self, date (month, unable to state year, say that's usual for him) and place.  Attention intact.  No dysarthria, aphasia or neglect.  Knowledge intact.  Registration intact.  Short and long term memory grossly intact.      Cranial Nerves: CN I - not tested.  PERRLA 2mm brisk, EOMI, VFF, no nystagmus or diplopia.  CN V1-3 intact to light touch.  No facial asymmetry.  Hearing intact to finger rub bilaterally.  Tongue, uvula and palate midline.  Sternocleidomastoid and Trapezius intact bilaterally.    Motor:   Tone: normal.                  Strength:     Moves all extremities equally, 5/5 strength  Pronator drift: none                 Dysmetria: None to finger-nose-finger   No truncal ataxia.    Tremor: No resting, postural or action tremor.  No myoclonus.    Sensation: intact to light touch  Deep Tendon Reflexes: 1+ bilateral biceps, triceps, brachioradialis, knee and ankle  Toes mute    Gait: Deferred      CBC Full  -  ( 03 Sep 2019 06:54 )  WBC Count : 9.24 K/uL  RBC Count : 4.14 M/uL  Hemoglobin : 12.2 g/dL  Hematocrit : 38.0 %  Platelet Count - Automated : 227 K/uL  Mean Cell Volume : 91.8 fl  Mean Cell Hemoglobin : 29.5 pg  Mean Cell Hemoglobin Concentration : 32.1 gm/dL  Auto Neutrophil # : 6.63 K/uL  Auto Lymphocyte # : 1.54 K/uL  Auto Monocyte # : 0.81 K/uL  Auto Eosinophil # : 0.17 K/uL  Auto Basophil # : 0.03 K/uL  Auto Neutrophil % : 71.8 %  Auto Lymphocyte % : 16.7 %  Auto Monocyte % : 8.8 %  Auto Eosinophil % : 1.8 %  Auto Basophil % : 0.3 %    09-03    142  |  114<H>  |  23  ----------------------------<  140<H>  3.9   |  18<L>  |  2.71<H>    Ca    8.3<L>      03 Sep 2019 06:54  Phos  4.1     09-03  Mg     2.4     09-03    TPro  6.6  /  Alb  4.1  /  TBili  0.4  /  DBili  x   /  AST  10  /  ALT  11  /  AlkPhos  84  09-01    LIVER FUNCTIONS - ( 01 Sep 2019 11:38 )  Alb: 4.1 g/dL / Pro: 6.6 g/dL / ALK PHOS: 84 U/L / ALT: 11 U/L / AST: 10 U/L / GGT: x             EEG  Mild generalized background slowing, no epileptiform activity

## 2019-09-03 NOTE — DIETITIAN INITIAL EVALUATION ADULT. - ENERGY NEEDS
Height 70"; .4#; #; 119.5%IBW  BMI 28.5  ActualBW used for calculations as pt between % of IBW. Needs estimated for maintenance in adults

## 2019-09-03 NOTE — PROGRESS NOTE ADULT - PROBLEM SELECTOR PLAN 1
-No known seizure history as per patient   -Keppra stopped after this am's dose per neuro  -C/w EEG for another 24h, so far no epilepsy events have been noted on the EEG   -Determining other possible etiologies for seizures: Infection (UA shows no infection, BCx NGTD), Drugs (Utox positive for opioids and methadone), Metabolic derangement (ABG showing mild resp alkalosis, Negative BHB, UA showing no ketones)  -Patient states he takes 4mg of methadone Q2 days   -Remains AOx3 No known seizure history as per patient . Seizure most likely 2/2 metabolic hyperglycemia and metabolic derangement.   -Keppra stopped after this am's dose per neuro  -C/w EEG for another 24h off Keppra, so far no epilepsy events have been noted on the EEG   -Patient states he takes 4mg of methadone Q2 days   -Remains AOx3

## 2019-09-04 ENCOUNTER — TRANSCRIPTION ENCOUNTER (OUTPATIENT)
Age: 59
End: 2019-09-04

## 2019-09-04 DIAGNOSIS — E03.9 HYPOTHYROIDISM, UNSPECIFIED: ICD-10-CM

## 2019-09-04 DIAGNOSIS — F11.99 OPIOID USE, UNSPECIFIED WITH UNSPECIFIED OPIOID-INDUCED DISORDER: ICD-10-CM

## 2019-09-04 DIAGNOSIS — I10 ESSENTIAL (PRIMARY) HYPERTENSION: ICD-10-CM

## 2019-09-04 PROBLEM — Z00.00 ENCOUNTER FOR PREVENTIVE HEALTH EXAMINATION: Status: ACTIVE | Noted: 2019-09-04

## 2019-09-04 LAB
ANION GAP SERPL CALC-SCNC: 10 MMOL/L — SIGNIFICANT CHANGE UP (ref 5–17)
BUN SERPL-MCNC: 23 MG/DL — SIGNIFICANT CHANGE UP (ref 7–23)
CALCIUM SERPL-MCNC: 8.6 MG/DL — SIGNIFICANT CHANGE UP (ref 8.4–10.5)
CHLORIDE SERPL-SCNC: 111 MMOL/L — HIGH (ref 96–108)
CO2 SERPL-SCNC: 21 MMOL/L — LOW (ref 22–31)
CREAT SERPL-MCNC: 2.01 MG/DL — HIGH (ref 0.5–1.3)
GLUCOSE SERPL-MCNC: 118 MG/DL — HIGH (ref 70–99)
HCT VFR BLD CALC: 36.5 % — LOW (ref 39–50)
HGB BLD-MCNC: 11.8 G/DL — LOW (ref 13–17)
MAGNESIUM SERPL-MCNC: 2.3 MG/DL — SIGNIFICANT CHANGE UP (ref 1.6–2.6)
MCHC RBC-ENTMCNC: 29.6 PG — SIGNIFICANT CHANGE UP (ref 27–34)
MCHC RBC-ENTMCNC: 32.3 GM/DL — SIGNIFICANT CHANGE UP (ref 32–36)
MCV RBC AUTO: 91.5 FL — SIGNIFICANT CHANGE UP (ref 80–100)
NRBC # BLD: 0 /100 WBCS — SIGNIFICANT CHANGE UP (ref 0–0)
PHOSPHATE SERPL-MCNC: 3.3 MG/DL — SIGNIFICANT CHANGE UP (ref 2.5–4.5)
PLATELET # BLD AUTO: 236 K/UL — SIGNIFICANT CHANGE UP (ref 150–400)
POTASSIUM SERPL-MCNC: 3.9 MMOL/L — SIGNIFICANT CHANGE UP (ref 3.5–5.3)
POTASSIUM SERPL-SCNC: 3.9 MMOL/L — SIGNIFICANT CHANGE UP (ref 3.5–5.3)
RBC # BLD: 3.99 M/UL — LOW (ref 4.2–5.8)
RBC # FLD: 13.2 % — SIGNIFICANT CHANGE UP (ref 10.3–14.5)
SODIUM SERPL-SCNC: 142 MMOL/L — SIGNIFICANT CHANGE UP (ref 135–145)
WBC # BLD: 8.55 K/UL — SIGNIFICANT CHANGE UP (ref 3.8–10.5)
WBC # FLD AUTO: 8.55 K/UL — SIGNIFICANT CHANGE UP (ref 3.8–10.5)

## 2019-09-04 PROCEDURE — 99231 SBSQ HOSP IP/OBS SF/LOW 25: CPT

## 2019-09-04 PROCEDURE — 99222 1ST HOSP IP/OBS MODERATE 55: CPT | Mod: GC

## 2019-09-04 PROCEDURE — 99233 SBSQ HOSP IP/OBS HIGH 50: CPT | Mod: GC

## 2019-09-04 PROCEDURE — 95951: CPT | Mod: 26

## 2019-09-04 RX ORDER — INSULIN LISPRO 100/ML
4 VIAL (ML) SUBCUTANEOUS
Refills: 0 | Status: DISCONTINUED | OUTPATIENT
Start: 2019-09-04 | End: 2019-09-05

## 2019-09-04 RX ORDER — SODIUM CHLORIDE 9 MG/ML
1000 INJECTION, SOLUTION INTRAVENOUS
Refills: 0 | Status: DISCONTINUED | OUTPATIENT
Start: 2019-09-04 | End: 2019-09-05

## 2019-09-04 RX ORDER — SITAGLIPTIN 50 MG/1
1 TABLET, FILM COATED ORAL
Qty: 90 | Refills: 0
Start: 2019-09-04 | End: 2019-12-02

## 2019-09-04 RX ADMIN — ENOXAPARIN SODIUM 40 MILLIGRAM(S): 100 INJECTION SUBCUTANEOUS at 22:02

## 2019-09-04 RX ADMIN — Medication 2: at 22:03

## 2019-09-04 RX ADMIN — Medication 2: at 17:51

## 2019-09-04 RX ADMIN — Medication 2: at 12:25

## 2019-09-04 RX ADMIN — SODIUM CHLORIDE 130 MILLILITER(S): 9 INJECTION, SOLUTION INTRAVENOUS at 01:33

## 2019-09-04 RX ADMIN — INSULIN GLARGINE 12 UNIT(S): 100 INJECTION, SOLUTION SUBCUTANEOUS at 22:02

## 2019-09-04 NOTE — PROGRESS NOTE ADULT - ASSESSMENT
Mr Perez is a 58-year-old man who presented on 9/1/19 with status epilepticus (4 seizures within a 1-2 hour window without return to baseline in between), likely provoked by hyperglycemia and metabolic acidosis, now resolved.  Patient denies any history of seizures and current EEG shows no focal abnormalities, epileptiform discharges, or seizures.  Thus I do not suspect that he has underlying epilepsy but rather the seizures were provoked by hyperglycemia and metabolic derangement, and therefore would not recommend continuing long-term AED therapy.    # Uncontrolled diabetes c/b hyperglycemic state and status epilepticus, now resolved    Recommendations:  - D/C vEEG monitoring   - Diabetes management per primary team  - Maintain seizure and fall precautions  - Please call with additional questions Mr Perez is a 58-year-old man who presented on 9/1/19 with status epilepticus (4 seizures within a 1-2 hour window without return to baseline in between), likely provoked by hyperglycemia and metabolic acidosis, now resolved.  Patient denies any history of seizures and current EEG shows no focal abnormalities, epileptiform discharges, or seizures, 24 hours after discontinuation of AED therapy.  Seizure likely were provoked by hyperglycemia and metabolic derangement, and therefore would not recommend continuing long-term AED therapy.      # Uncontrolled diabetes c/b hyperglycemic state and status epilepticus, now resolved    Recommendations:  - D/C vEEG monitoring   - Diabetes management per primary team  - Maintain seizure and fall precautions  - Please call with additional questions  - Epilepsy will sign off, please call with any additional questions

## 2019-09-04 NOTE — CONSULT NOTE ADULT - SUBJECTIVE AND OBJECTIVE BOX
HPI: 58yMale    Age at Dx:  How dx:  Hx and duration of insulin:  Current Therapy:  Hx of hypoglycemia  Hx of DKA/HHS?    Home FSG:  Fasting  Lunch  Dinner  Bed    Hx of other regimens  Complications:  Outpatient Endo:    PMH & Surgical Hx:SEIZURES  Family history unknown  Handoff  MEWS Score  Medical history unknown  Status epilepticus  Subclinical hypothyroidism  Opioid use disorder  Hypertension  Transition of care performed with sharing of clinical summary  Nutrition, metabolism, and development symptoms  Anemia  Drug abuse  Metabolic acidosis  MELI (acute kidney injury)  Diabetes mellitus  Status epilepticus  Surgical history unknown  SEIZURE  Metabolic acidosis      FH:  DM:  Thyroid:  Autoimmune:  Other:    SH:  Smoking  Etoh:  Recreational Drugs:  Social Life:    Current Meds:  dextrose 40% Gel 15 Gram(s) Oral once PRN  dextrose 5%. 1000 milliLiter(s) IV Continuous <Continuous>  dextrose 50% Injectable 12.5 Gram(s) IV Push once  dextrose 50% Injectable 25 Gram(s) IV Push once  dextrose 50% Injectable 25 Gram(s) IV Push once  enoxaparin Injectable 40 milliGRAM(s) SubCutaneous every 24 hours  glucagon  Injectable 1 milliGRAM(s) IntraMuscular once PRN  insulin glargine Injectable (LANTUS) 12 Unit(s) SubCutaneous at bedtime  insulin lispro (HumaLOG) corrective regimen sliding scale   SubCutaneous Before meals and at bedtime  lactated ringers. 1000 milliLiter(s) IV Continuous <Continuous>      Allergies:  Allergy Status Unknown      ROS:  Denies the following except as indicated.    General: weight loss/weight gain, decreased appetite, fatigue  Eyes: Blurry vision, double vision, visual changes  ENT: Throat pain, changes in voice,   CV: palpitations, SOB, CP, cough  GI: NVD, difficulty swallowing, abdominal pain  : polyuria, dysuria  Endo: abnormal menses, temperature intolerance, decreased libido  MSK: weakness, joint pain  Skin: rash, dryness, diaphoresis  Heme: Easy bruising,bleeding  Neuro: HA, dizziness, lightheadedness, numbness tingling  Psych: Anxiety, Depression    Vital Signs Last 24 Hrs  T(C): 37.1 (04 Sep 2019 16:01), Max: 37.1 (03 Sep 2019 22:32)  T(F): 98.8 (04 Sep 2019 16:01), Max: 98.8 (04 Sep 2019 16:01)  HR: 53 (04 Sep 2019 16:01) (53 - 69)  BP: 159/87 (04 Sep 2019 16:01) (120/72 - 166/85)  BP(mean): 119 (03 Sep 2019 20:21) (119 - 119)  RR: 17 (04 Sep 2019 16:01) (16 - 18)  SpO2: 99% (04 Sep 2019 16:01) (97% - 99%)  Height (cm): 177.8 (09-01 @ 15:32)  Weight (kg): 90 (09-01 @ 09:08)  BMI (kg/m2): 28.5 (09-01 @ 15:32)      Constitutional: wn/wd in NAD.   HEENT: NCAT, MMM, OP clear, EOMI, , no proptosis or lid retraction  Neck: no thyromegaly or palpable thyroid nodules   Respiratory: lungs CTAB.  Cardiovascular: regular rhythm, normal S1 and S2, no audible murmurs, no peripheral edema  GI: soft, NT/ND, no masses/HSM appreciated.  Neurology: no tremors, DTR 2+  Skin: no visible rashes/lesions  Psychiatric: AAO x 3, normal affect/mood.  Ext: radial pulses intact, DP pulses intact, extremities warm, no cyanosis, clubbing or edema.       LABS:                        11.8   8.55  )-----------( 236      ( 04 Sep 2019 07:38 )             36.5     09-04    142  |  111<H>  |  23  ----------------------------<  118<H>  3.9   |  21<L>  |  2.01<H>    Ca    8.6      04 Sep 2019 07:38  Phos  3.3     09-04  Mg     2.3     09-04          Hemoglobin A1C, Whole Blood: 12.4 (09-01 @ 11:38)    Thyroid Stimulating Hormone, Serum: 4.146 (09-01 @ 08:23)      RADIOLOGY & ADDITIONAL STUDIES:  CAPILLARY BLOOD GLUCOSE      POCT Blood Glucose.: 156 mg/dL (04 Sep 2019 17:31)  POCT Blood Glucose.: 183 mg/dL (04 Sep 2019 12:14)  POCT Blood Glucose.: 115 mg/dL (04 Sep 2019 09:05)  POCT Blood Glucose.: 258 mg/dL (03 Sep 2019 20:58)        A/P:58y Male    1.  DM  Please continue lantus       units at night / morning.  Please continue lispro      units before each meal.  Please continue lispro moderate / low dose sliding scale four times daily with meals and at bedtime    Pt's fingerstick glucose goal is     Will continue to monitor     For discharge, pt can continue    Pt can follow up at discharge with [facility] by calling  to make an appointment.   Will discuss case with     and update primary team HPI: 59 yo M with pmhx of opioid abuse on methadone and DM Type II presenting with a single episode of seizure while on the train (no known hx of epilepsy). enroute via EMS, patient had another episode of seizure. In the ED,  the patient experienced 2 more episodes of seizures, and was in status epilepticus requiring intubation and sedation for airway management. Upon admission, blood sugars were elevated into the 370s upon admission but BHB was negative and there were no ketones in the urine. Patient also came in with a non-anion gap metabolic acidosis - likely secondary to lactic acidosis from seizures. UTOX positive for methadone and opioids. Patient remained sedated and intubated for one day before a successful extubation the next day. The patient also had MELI with Cr 2.71 with decreased urine output. Urine output resolved with fluid bolus, the Cr remains elevated at 2.01 with GFR 41. The patient's mental status returned to AOx3 and neuro exam was grossly normal. Patient underwent 24h EEG, part of it while he was still sedated. Neurology stopped the keppra as of yesterday. Patient was transferred from [facility] yesterday.  Endocrine was consulted for his DM management. During the hospital stay, patient was NPO till day before yesterday and was started on diet yesterday. labs showed Hba1c 12.4, TSH 4.146, free T4 1.12 and total T3 96. He is currently on Lantus 12 units QHS and lispro MDSS, and on MSD carb diet    FSG & Insulin received:  Yesterday:  pre-dinner fs, 2 units lispro SS, had some apple sauce  bedtime fs, 12 lantus   units + 6   units lispro SS    Today:  pre-breakfast fs,   pre-lunch fs, 2 units lispro SS  Endocrine history   Age at Dx: He was diagnosed with DM 3 years ago but has been on meds for the past 2 years  How dx: It was diagnosed with regular labs by her PCP  Hx and duration of insulin: He has never been on insulin  Current Therapy: Januvia 100mg once daily and metformin 1000mg once daily  Hx of hypoglycemia - none  Hx of DKA/HHS? - denies    Home FSG: He does not check his FSG frequently. He may check his FSG once a week. 2 years ago, his FSG was in 150s. Since he went to rehab place for 1 year, he has been consuming lot of carb portions and his FSG has been in 400-500s. He eats sandwich for breakfast, chicken and rice with beand for either lunch or dinner. he most often has only 2 meals a day. He eats one meal at the rehab and takes getaways from outside. He says that he has been missing his meds since he had to wait a long line at rehab center with 130 people waiting in line. He has not been complaint with his meds       Hx of other regimens  Complications: neuropathy  Outpatient Endo:     PMH & Surgical Hx:SEIZURES  Status epilepticus  Subclinical hypothyroidism  Opioid use disorder  Hypertension  Drug abuse  Metabolic acidosis  MELI (acute kidney injury)  no surgeries in the past    FH:  3 of his siblings have type 2 DM    SH: He is currently in a rehab program for his heroin addiction for about 1 year      Current Meds:  dextrose 40% Gel 15 Gram(s) Oral once PRN  dextrose 5%. 1000 milliLiter(s) IV Continuous <Continuous>  dextrose 50% Injectable 12.5 Gram(s) IV Push once  dextrose 50% Injectable 25 Gram(s) IV Push once  dextrose 50% Injectable 25 Gram(s) IV Push once  enoxaparin Injectable 40 milliGRAM(s) SubCutaneous every 24 hours  glucagon  Injectable 1 milliGRAM(s) IntraMuscular once PRN  insulin glargine Injectable (LANTUS) 12 Unit(s) SubCutaneous at bedtime  insulin lispro (HumaLOG) corrective regimen sliding scale   SubCutaneous Before meals and at bedtime  lactated ringers. 1000 milliLiter(s) IV Continuous <Continuous>      Allergies:  Allergy Status Unknown      ROS:  Denies the following except as indicated.    General: weight loss/weight gain, decreased appetite, fatigue  Eyes: Blurry vision, double vision, visual changes  ENT: Throat pain, changes in voice,   CV: palpitations, SOB, CP, cough  GI: NVD, difficulty swallowing, abdominal pain  : polyuria, dysuria  Endo: temperature intolerance, decreased libido  MSK: weakness, joint pain  Skin: rash, dryness, diaphoresis  Heme: Easy bruising,bleeding  Neuro: HA, dizziness, lightheadedness, numbness tingling  Psych: Anxiety, Depression    Vital Signs Last 24 Hrs  T(C): 37.1 (04 Sep 2019 16:01), Max: 37.1 (03 Sep 2019 22:32)  T(F): 98.8 (04 Sep 2019 16:01), Max: 98.8 (04 Sep 2019 16:01)  HR: 53 (04 Sep 2019 16:01) (53 - 69)  BP: 159/87 (04 Sep 2019 16:01) (120/72 - 166/85)  BP(mean): 119 (03 Sep 2019 20:21) (119 - 119)  RR: 17 (04 Sep 2019 16:01) (16 - 18)  SpO2: 99% (04 Sep 2019 16:01) (97% - 99%)  Height (cm): 177.8 ( @ 15:32)  Weight (kg): 90 ( @ 09:08)  BMI (kg/m2): 28.5 ( @ 15:32)      Constitutional: wn/wd in NAD.   HEENT: NCAT, MMM, OP clear, EOMI, , no proptosis or lid retraction  Neck: no thyromegaly or palpable thyroid nodules   Respiratory: lungs CTAB.  Cardiovascular: regular rhythm, normal S1 and S2, no audible murmurs, no peripheral edema  GI: soft, NT/ND, no masses/HSM appreciated.  Neurology: no tremors, DTR 2+, no focal neurological deficits  Skin: no visible rashes/lesions  Psychiatric: AAO x 3, normal affect/mood.  Ext: radial pulses intact, DP pulses intact, extremities warm, no cyanosis, clubbing or edema.       LABS:                        11.8   8.55  )-----------( 236      ( 04 Sep 2019 07:38 )             36.5     -04    142  |  111<H>  |  23  ----------------------------<  118<H>  3.9   |  21<L>  |  2.01<H>    Ca    8.6      04 Sep 2019 07:38  Phos  3.3     -  Mg     2.3     -          Hemoglobin A1C, Whole Blood: 12.4 ( @ 11:38)    Thyroid Stimulating Hormone, Serum: 4.146 ( @ 08:23)      RADIOLOGY & ADDITIONAL STUDIES:  CAPILLARY BLOOD GLUCOSE      POCT Blood Glucose.: 156 mg/dL (04 Sep 2019 17:31)  POCT Blood Glucose.: 183 mg/dL (04 Sep 2019 12:14)  POCT Blood Glucose.: 115 mg/dL (04 Sep 2019 09:05)  POCT Blood Glucose.: 258 mg/dL (03 Sep 2019 20:58)        A/P: 59 yo M with pmhx of opioid abuse on methadone and DM Type II got admitted with seizures    1.  DM Type 2 uncontrolled - with neuropathy  Hba1c 12.4  Cr/GFR 2.01/ 41  Wt 90kg with BMI 28.5  Please continue lantus 12 units at night.  Please start on lispro 4  units before each meal.    Please continue lispro moderate dose sliding scale four times daily with meals and at bedtime  carb consistent diet  please obtain c-peptide level  Pt's fingerstick glucose goal is 120-150    Will continue to monitor     For discharge, pt can continue    Pt can follow up at discharge with [facility] by calling  to make an appointment.   Discussed case with     and updated primary team

## 2019-09-04 NOTE — EEG REPORT - NS EEG TEXT BOX
Continuous EEG Report  9/3/2019 @ 7 AM to 9/4/2019 @ 7 AM:     Pertinent medications: Keppra 1g in AM, then off AEDs  The was continuous, composed of predominantly alpha frequencies with a symmetric 9 Hz posterior dominant rhythm.  No persistent focal asymmetries.  No epileptiform discharges, seizures, or significant clinical events.    Daily Summary:    1)	Normal EEG, awake and asleep.  2)	No seizures or significant clinical events.  Read by: Mag Diamond MD Continuous EEG Report  9/3/2019 @ 7 AM to 9/4/2019 @ 7 AM:     Pertinent medications: Keppra 1g in AM, then off AEDs  The background was continuous, composed of predominantly alpha frequencies with a symmetric 9 Hz posterior dominant rhythm.  No persistent focal asymmetries.  No epileptiform discharges, seizures, or significant clinical events.    Daily Summary:    1)	Normal EEG, awake and asleep.  2)	No seizures or significant clinical events.  Read by: Mag Diamond MD

## 2019-09-04 NOTE — PROGRESS NOTE ADULT - PROBLEM SELECTOR PLAN 5
Patient with no known history of HTN.   -BP today high 150s/80s  -consider starting Amlodipine  -closely monitor BP  -sodium restricted diet Improved: Patient with no known history of HTN.  on admission.  --130 no need to intervene  -closely monitor BP  -sodium restricted diet

## 2019-09-04 NOTE — PROGRESS NOTE ADULT - SUBJECTIVE AND OBJECTIVE BOX
Subjective  No report of seizures overnight, and patient states that he is feeling much better today. Denies headache and dizziness. Keppra was discontinued yesterday.     Review of Systems:  CONSTITUTIONAL:  No weight loss, fever, chills, weakness or fatigue.  HEENT:  Eyes:  No visual loss, blurred vision, double vision or yellow sclerae. Ears, Nose, Throat:  No hearing loss, sneezing, congestion, runny nose or sore throat.  SKIN:  No rash or itching.  CARDIOVASCULAR:  No chest pain, chest pressure or chest discomfort. No palpitations or edema.  RESPIRATORY:  No shortness of breath, cough or sputum.  GASTROINTESTINAL:  No anorexia, nausea, vomiting or diarrhea. No abdominal pain or blood.  GENITOURINARY: No Burning on urination.   NEUROLOGICAL:  see HPI  MUSCULOSKELETAL:  No muscle, back pain, joint pain or stiffness.  HEMATOLOGIC:  No anemia, bleeding or bruising.  LYMPHATICS:  No enlarged nodes. No history of splenectomy.  PSYCHIATRIC:  No history of depression or anxiety.  ENDOCRINOLOGIC:  No reports of sweating, cold or heat intolerance. No polyuria or polydipsia.  ALLERGIES:  No history of asthma, hives, eczema or rhinitis.       MEDICATIONS  (STANDING):  dextrose 5%. 1000 milliLiter(s) (50 mL/Hr) IV Continuous <Continuous>  dextrose 50% Injectable 12.5 Gram(s) IV Push once  dextrose 50% Injectable 25 Gram(s) IV Push once  dextrose 50% Injectable 25 Gram(s) IV Push once  enoxaparin Injectable 40 milliGRAM(s) SubCutaneous every 24 hours  insulin glargine Injectable (LANTUS) 12 Unit(s) SubCutaneous at bedtime  insulin lispro (HumaLOG) corrective regimen sliding scale   SubCutaneous Before meals and at bedtime  lactated ringers. 1000 milliLiter(s) (130 mL/Hr) IV Continuous <Continuous>    MEDICATIONS  (PRN):  dextrose 40% Gel 15 Gram(s) Oral once PRN Blood Glucose LESS THAN 70 milliGRAM(s)/deciliter  glucagon  Injectable 1 milliGRAM(s) IntraMuscular once PRN Glucose LESS THAN 70 milligrams/deciliter      T(C): 36.4 (09-04-19 @ 09:37), Max: 37.2 (09-03-19 @ 18:35)  HR: 69 (09-04-19 @ 09:37) (58 - 69)  BP: 130/75 (09-04-19 @ 09:37) (120/72 - 166/85)  RR: 16 (09-04-19 @ 09:37) (15 - 18)  SpO2: 98% (09-04-19 @ 09:37) (97% - 100%)  Wt(kg): --    General Adult Exam  GENERAL APPEARANCE: Patient appears to be in no acute distress.  EYES: PERRLA 2mm brisk, EOMI. vision is grossly intact.  EARS: External auditory canals and tympanic membranes clear, hearing grossly intact.  NOSE: No nasal discharge.  CARDIAC: Normal S1 and S2. No S3, S4 or murmurs. Rhythm is regular. There is no peripheral edema, cyanosis or pallor. Extremities are warm and well perfused. Capillary refill is less than 2 seconds. No carotid bruits.  LUNGS: Clear to auscultation   ABDOMEN: Positive bowel sounds. Soft, nondistended, nontender. No guarding or rebound. No masses.  MUSCULOSKELETAL: Normal muscular development.   EXTREMITIES: No significant deformity or joint abnormality. No edema. Peripheral pulses intact. No varicosities.  SKIN: Skin normal color, texture and turgor with no lesions or eruptions.    Neurological Exam:  Mental Status: Alert and Orientated to self, date (month, unable to state year, say that's usual for him) and place.  Attention intact.  No dysarthria, aphasia or neglect.  Knowledge intact.  Registration intact.  Short and long term memory grossly intact.      Cranial Nerves: CN I - not tested.  PERRLA 2mm brisk, EOMI, VFF, no nystagmus or diplopia.  CN V1-3 intact to light touch.  No facial asymmetry.  Hearing intact to finger rub bilaterally.  Tongue, uvula and palate midline.  Sternocleidomastoid and Trapezius intact bilaterally.    Motor:   Tone: normal.                  Strength:     Moves all extremities equally, 5/5 strength  Pronator drift: none                 Dysmetria: None to finger-nose-finger   No truncal ataxia.    Tremor: No resting, postural or action tremor.  No myoclonus.  Sensation: intact to light touch  Deep Tendon Reflexes: 1+ bilateral biceps, triceps, brachioradialis, knee and ankle  Toes mute  Gait: Steady gait, toe and heel walking normal. Romberg normal.       CBC Full  -  ( 04 Sep 2019 07:38 )  WBC Count : 8.55 K/uL  RBC Count : 3.99 M/uL  Hemoglobin : 11.8 g/dL  Hematocrit : 36.5 %  Platelet Count - Automated : 236 K/uL  Mean Cell Volume : 91.5 fl  Mean Cell Hemoglobin : 29.6 pg  Mean Cell Hemoglobin Concentration : 32.3 gm/dL  Auto Neutrophil # : x  Auto Lymphocyte # : x  Auto Monocyte # : x  Auto Eosinophil # : x  Auto Basophil # : x  Auto Neutrophil % : x  Auto Lymphocyte % : x  Auto Monocyte % : x  Auto Eosinophil % : x  Auto Basophil % : x    09-04    142  |  111<H>  |  23  ----------------------------<  118<H>  3.9   |  21<L>  |  2.01<H>    Ca    8.6      04 Sep 2019 07:38  Phos  3.3     09-04  Mg     2.3     09-04      EEG: Daily Summary:    1)	Normal EEG, awake and asleep.  2)	No seizures or significant clinical events.  Read by: Mag Diamond MD    Electronic Signatures:  Mag Diamond)  (Signed 04-Sep-2019 11:21)  	Authored: EEG REPORT Subjective  No report of seizures overnight, and patient states that he is feeling much better today. Denies headache and dizziness. Keppra was discontinued yesterday. Creatinine level downtrending    Review of Systems:  CONSTITUTIONAL:  No weight loss, fever, chills, weakness or fatigue.  HEENT:  Eyes:  No visual loss, blurred vision, double vision or yellow sclerae. Ears, Nose, Throat:  No hearing loss, sneezing, congestion, runny nose or sore throat.  SKIN:  No rash or itching.  CARDIOVASCULAR:  No chest pain, chest pressure or chest discomfort. No palpitations or edema.  RESPIRATORY:  No shortness of breath, cough or sputum.  GASTROINTESTINAL:  No anorexia, nausea, vomiting or diarrhea. No abdominal pain or blood.  GENITOURINARY: No Burning on urination.   NEUROLOGICAL:  see HPI  MUSCULOSKELETAL:  No muscle, back pain, joint pain or stiffness.  HEMATOLOGIC:  No anemia, bleeding or bruising.  LYMPHATICS:  No enlarged nodes. No history of splenectomy.  PSYCHIATRIC:  No history of depression or anxiety.  ENDOCRINOLOGIC:  No reports of sweating, cold or heat intolerance. No polyuria or polydipsia.  ALLERGIES:  No history of asthma, hives, eczema or rhinitis.       MEDICATIONS  (STANDING):  dextrose 5%. 1000 milliLiter(s) (50 mL/Hr) IV Continuous <Continuous>  dextrose 50% Injectable 12.5 Gram(s) IV Push once  dextrose 50% Injectable 25 Gram(s) IV Push once  dextrose 50% Injectable 25 Gram(s) IV Push once  enoxaparin Injectable 40 milliGRAM(s) SubCutaneous every 24 hours  insulin glargine Injectable (LANTUS) 12 Unit(s) SubCutaneous at bedtime  insulin lispro (HumaLOG) corrective regimen sliding scale   SubCutaneous Before meals and at bedtime  lactated ringers. 1000 milliLiter(s) (130 mL/Hr) IV Continuous <Continuous>    MEDICATIONS  (PRN):  dextrose 40% Gel 15 Gram(s) Oral once PRN Blood Glucose LESS THAN 70 milliGRAM(s)/deciliter  glucagon  Injectable 1 milliGRAM(s) IntraMuscular once PRN Glucose LESS THAN 70 milligrams/deciliter      T(C): 36.4 (09-04-19 @ 09:37), Max: 37.2 (09-03-19 @ 18:35)  HR: 69 (09-04-19 @ 09:37) (58 - 69)  BP: 130/75 (09-04-19 @ 09:37) (120/72 - 166/85)  RR: 16 (09-04-19 @ 09:37) (15 - 18)  SpO2: 98% (09-04-19 @ 09:37) (97% - 100%)  Wt(kg): --    General Adult Exam  GENERAL APPEARANCE: Patient appears to be in no acute distress.  EYES: PERRLA 2mm brisk, EOMI. vision is grossly intact.  EARS: External auditory canals and tympanic membranes clear, hearing grossly intact.  NOSE: No nasal discharge.  CARDIAC: Normal S1 and S2. No S3, S4 or murmurs. Rhythm is regular. There is no peripheral edema, cyanosis or pallor. Extremities are warm and well perfused. Capillary refill is less than 2 seconds. No carotid bruits.  LUNGS: Clear to auscultation   ABDOMEN: Positive bowel sounds. Soft, nondistended, nontender. No guarding or rebound. No masses.  MUSCULOSKELETAL: Normal muscular development.   EXTREMITIES: No significant deformity or joint abnormality. No edema. Peripheral pulses intact. No varicosities.  SKIN: Skin normal color, texture and turgor with no lesions or eruptions.    Neurological Exam:  Mental Status: Alert and Orientated to self, date (month, unable to state year, say that's usual for him) and place.  Attention intact.  No dysarthria, aphasia or neglect.  Knowledge intact.  Registration intact.  Short and long term memory grossly intact.      Cranial Nerves: CN I - not tested.  PERRLA 2mm brisk, EOMI, VFF, no nystagmus or diplopia.  CN V1-3 intact to light touch.  No facial asymmetry.  Hearing intact to finger rub bilaterally.  Tongue, uvula and palate midline.  Sternocleidomastoid and Trapezius intact bilaterally.    Motor:   Tone: normal.                  Strength:     Moves all extremities equally, 5/5 strength  Pronator drift: none                 Dysmetria: None to finger-nose-finger   No truncal ataxia.    Tremor: No resting, postural or action tremor.  No myoclonus.  Sensation: intact to light touch  Deep Tendon Reflexes: 1+ bilateral biceps, triceps, brachioradialis, knee and ankle  Toes mute  Gait: Steady gait, toe and heel walking normal. Romberg normal.       CBC Full  -  ( 04 Sep 2019 07:38 )  WBC Count : 8.55 K/uL  RBC Count : 3.99 M/uL  Hemoglobin : 11.8 g/dL  Hematocrit : 36.5 %  Platelet Count - Automated : 236 K/uL  Mean Cell Volume : 91.5 fl  Mean Cell Hemoglobin : 29.6 pg  Mean Cell Hemoglobin Concentration : 32.3 gm/dL  Auto Neutrophil # : x  Auto Lymphocyte # : x  Auto Monocyte # : x  Auto Eosinophil # : x  Auto Basophil # : x  Auto Neutrophil % : x  Auto Lymphocyte % : x  Auto Monocyte % : x  Auto Eosinophil % : x  Auto Basophil % : x    09-04    142  |  111<H>  |  23  ----------------------------<  118<H>  3.9   |  21<L>  |  2.01<H>    Ca    8.6      04 Sep 2019 07:38  Phos  3.3     09-04  Mg     2.3     09-04      EEG: Daily Summary:    1)	Normal EEG, awake and asleep.  2)	No seizures or significant clinical events.  Read by: Mag Diamond MD    Electronic Signatures:  Mag Diamond)  (Signed 04-Sep-2019 11:21)  	Authored: EEG REPORT Subjective  No report of seizures overnight, and patient states that he is feeling much better today. Denies headache and dizziness. Keppra was discontinued yesterday. EEG remains normal off Keppra.  Creatinine level downtrending    Review of Systems:  CONSTITUTIONAL:  No weight loss, fever, chills, weakness or fatigue.  HEENT:  Eyes:  No visual loss, blurred vision, double vision or yellow sclerae. Ears, Nose, Throat:  No hearing loss, sneezing, congestion, runny nose or sore throat.  SKIN:  No rash or itching.  CARDIOVASCULAR:  No chest pain, chest pressure or chest discomfort. No palpitations or edema.  RESPIRATORY:  No shortness of breath, cough or sputum.  GASTROINTESTINAL:  No anorexia, nausea, vomiting or diarrhea. No abdominal pain or blood.  GENITOURINARY: No Burning on urination.   NEUROLOGICAL:  see HPI  MUSCULOSKELETAL:  No muscle, back pain, joint pain or stiffness.  HEMATOLOGIC:  No anemia, bleeding or bruising.  LYMPHATICS:  No enlarged nodes. No history of splenectomy.  PSYCHIATRIC:  No history of depression or anxiety.  ENDOCRINOLOGIC:  No reports of sweating, cold or heat intolerance. No polyuria or polydipsia.  ALLERGIES:  No history of asthma, hives, eczema or rhinitis.       MEDICATIONS  (STANDING):  dextrose 5%. 1000 milliLiter(s) (50 mL/Hr) IV Continuous <Continuous>  dextrose 50% Injectable 12.5 Gram(s) IV Push once  dextrose 50% Injectable 25 Gram(s) IV Push once  dextrose 50% Injectable 25 Gram(s) IV Push once  enoxaparin Injectable 40 milliGRAM(s) SubCutaneous every 24 hours  insulin glargine Injectable (LANTUS) 12 Unit(s) SubCutaneous at bedtime  insulin lispro (HumaLOG) corrective regimen sliding scale   SubCutaneous Before meals and at bedtime  lactated ringers. 1000 milliLiter(s) (130 mL/Hr) IV Continuous <Continuous>    MEDICATIONS  (PRN):  dextrose 40% Gel 15 Gram(s) Oral once PRN Blood Glucose LESS THAN 70 milliGRAM(s)/deciliter  glucagon  Injectable 1 milliGRAM(s) IntraMuscular once PRN Glucose LESS THAN 70 milligrams/deciliter      T(C): 36.4 (09-04-19 @ 09:37), Max: 37.2 (09-03-19 @ 18:35)  HR: 69 (09-04-19 @ 09:37) (58 - 69)  BP: 130/75 (09-04-19 @ 09:37) (120/72 - 166/85)  RR: 16 (09-04-19 @ 09:37) (15 - 18)  SpO2: 98% (09-04-19 @ 09:37) (97% - 100%)  Wt(kg): --    General Adult Exam  GENERAL APPEARANCE: Patient appears to be in no acute distress.  EYES: PERRLA 2mm brisk, EOMI. vision is grossly intact.  EARS: External auditory canals and tympanic membranes clear, hearing grossly intact.  NOSE: No nasal discharge.  CARDIAC: Normal S1 and S2. No S3, S4 or murmurs. Rhythm is regular. There is no peripheral edema, cyanosis or pallor. Extremities are warm and well perfused. Capillary refill is less than 2 seconds. No carotid bruits.  LUNGS: Clear to auscultation   ABDOMEN: Positive bowel sounds. Soft, nondistended, nontender. No guarding or rebound. No masses.  MUSCULOSKELETAL: Normal muscular development.   EXTREMITIES: No significant deformity or joint abnormality. No edema. Peripheral pulses intact. No varicosities.  SKIN: Skin normal color, texture and turgor with no lesions or eruptions.    Neurological Exam:  Mental Status: Alert and Orientated to self, date (month, unable to state year, say that's usual for him) and place.  Attention intact.  No dysarthria, aphasia or neglect.  Knowledge intact.  Registration intact.  Short and long term memory grossly intact.      Cranial Nerves: CN I - not tested.  PERRLA 2mm brisk, EOMI, VFF, no nystagmus or diplopia.  CN V1-3 intact to light touch.  No facial asymmetry.  Hearing intact to finger rub bilaterally.  Tongue, uvula and palate midline.  Sternocleidomastoid and Trapezius intact bilaterally.    Motor:   Tone: normal.                  Strength:     Moves all extremities equally, 5/5 strength  Pronator drift: none                 Dysmetria: None to finger-nose-finger   No truncal ataxia.    Tremor: No resting, postural or action tremor.  No myoclonus.  Sensation: intact to light touch  Deep Tendon Reflexes: 1+ bilateral biceps, triceps, brachioradialis, knee and ankle  Toes mute  Gait: Steady gait, toe and heel walking normal. Romberg normal.       CBC Full  -  ( 04 Sep 2019 07:38 )  WBC Count : 8.55 K/uL  RBC Count : 3.99 M/uL  Hemoglobin : 11.8 g/dL  Hematocrit : 36.5 %  Platelet Count - Automated : 236 K/uL  Mean Cell Volume : 91.5 fl  Mean Cell Hemoglobin : 29.6 pg  Mean Cell Hemoglobin Concentration : 32.3 gm/dL  Auto Neutrophil # : x  Auto Lymphocyte # : x  Auto Monocyte # : x  Auto Eosinophil # : x  Auto Basophil # : x  Auto Neutrophil % : x  Auto Lymphocyte % : x  Auto Monocyte % : x  Auto Eosinophil % : x  Auto Basophil % : x    09-04    142  |  111<H>  |  23  ----------------------------<  118<H>  3.9   |  21<L>  |  2.01<H>    Ca    8.6      04 Sep 2019 07:38  Phos  3.3     09-04  Mg     2.3     09-04      EEG: Daily Summary:    1)	Normal EEG, awake and asleep.  2)	No seizures or significant clinical events.  Read by: Mag Diamond MD

## 2019-09-04 NOTE — PROGRESS NOTE ADULT - PROBLEM SELECTOR PLAN 9
1) PCP Contacted on Admission: (Y/N) --> Name & Phone #:  2) Date of Contact with PCP: TBD  3) PCP Contacted at Discharge: TBD  4) Summary of Handoff Given to PCP: TBD   5) Post-Discharge Appointment Date: TBD F: none  E: replete electrolytes K< 4, Mg <2  N: diabetic diet   DVT PPx: Lovenox 40mg qd  Last BM: 9/3  Code status: Full Code  Dispo: [facility]

## 2019-09-04 NOTE — DISCHARGE NOTE PROVIDER - NSDCFUSCHEDAPPT_GEN_ALL_CORE_FT
SHAHANA RAMIRES ; 09/24/2019 ; [facility] [address] SHAHANA RAMIRES ; 09/24/2019 ; [facility] [address]

## 2019-09-04 NOTE — PROGRESS NOTE ADULT - PROBLEM SELECTOR PLAN 7
On admission Hgb 13.76. Hemodynamically stable. No signs or symptoms of bleeding.   - Hgb today 12.2, likely dilutional s/p **LR  - continue to closely monitor CBC On admission Hgb 13.76. Hemodynamically stable. Recently downtrending CBC. No signs or symptoms of bleeding. In setting of frequent lab draws, decreased PO intake, and fluids it is likely iatrogenic.    - continue to closely monitor CBC  -If continues to downtrend will begin workup

## 2019-09-04 NOTE — PROGRESS NOTE ADULT - ATTENDING COMMENTS
Patient seen and examined with house-staff during bedside rounds  Resident note read, including vitals, physical findings, laboratory data, and radiological reports.   Revisions included below.  Case discussed with House staff  Direct personal management at bedside  and extensive interpretation of data. Decision making of high complexity.
Patient was extubated today. Overnight EEG showed generalized slowing. Continue keppra and 24 hour EEG. Sliding scale insulin to continue but plan to add Lantus overnight. Acute kidney insufficiency with decreased urine output. Urine output responded to fluid resuscitation. Rest as above.
I saw and examined Mr. Perez with Gwen Dillard NP and I agree with the above note which I have edited to reflect my history, physical, assessment, and plan.
I saw and examined Mr. Perez with Gwen Dillard NP and I agree with the above note, which I have edited to reflect my history, physical, assessment, and plan.
Patient was seen and examined with the resident team today.  I agree with Dr. Ramos's assessment and plan with the following exceptions/additions:     Briefly, this is a 59yo gentleman with a PMH of remote opoid use (on Methadone, lives at inpatient rehab) and poorly controlled NIDDM2 (A1c 12.4%) who p/w metabolic encephalopathy and status epilepticus i/s/o hyperglycemia c/b acute respiratory failure and MELI, now s/p intubation and MICU admission.  Clinically improved with correction of hyperglycemia and brief course of AEDs.  Transferred to the [facility] on 9/3 for optimization fo his DM2.      Gen: NAD, sitting upright in bed  HEENT: NCAT, MMM, clear OP  Neck: supple, trachea at midline  CV: RRR, no m/g/r appreciated  Pulm: CTA B, no w/r/r; no increase in WOB  Abd: obese, normoactive BS, soft, NTND  Ext: WWP,no c/e/e  Neuro: AOx3, CN II-XII grossly intact; nonfocal  Psych: pleasant, conversant and appropriate    -- Endo consult and DM teaching  -- verify that housing will accept insulin as suspect he'll need to be discharged on this  -- will need outpatient f/u to assess resolution of MELI   -- DVT PPx -Lovenox  -- Dispo - TBD    Brenda Shaffer  [phone]
patient seen and examined; pt reports 3 days of HA prior to seizure episode, HAs since resolved   reviewed pertinent data, chart note  agree w/  above PE findings, pt on EEG off keppra, no focal neuro deficits     1. seizure: no clear cause noted, though appears metabolic in nature; followup EEG results off keppra; followup epilepsy recs  2. DM: c/w basal bolus regimen , followup endo consult   3. MELI/ non AG Met Acidosis: restarted on LRs overnight, monitor renal function / BMP  4. followup methadone dosing , monitor for w/d sxs     rest of plan as above   medical decision making : high complexity

## 2019-09-04 NOTE — PROGRESS NOTE ADULT - PROBLEM SELECTOR PLAN 2
Patient has a hx of DM Type II. Home meds include: jenuvia and metformin unclear of dose or how often he takes. Does not know pharmacy to get colalteral  -Negative BHB   -HbA1C of 12.4  -Lispro SS  -12 U Lantus at bedtime  -endo consult   -DM education ordered as patient will need insulin Patient has a hx of DM Type II. Home meds include: Januvia and metformin unclear of dose or how often he takes. Does not know pharmacy to get collateral  -Negative BHB   -HbA1C of 12.4  -Lispro SS  -12 U Lantus at bedtime  -endo consult   -DM education ordered as patient will need insulin

## 2019-09-04 NOTE — DISCHARGE NOTE PROVIDER - NSDCCPCAREPLAN_GEN_ALL_CORE_FT
PRINCIPAL DISCHARGE DIAGNOSIS  Diagnosis: Status epilepticus  Assessment and Plan of Treatment: You presented with an uncontrolled seisure that required intubation. This lasted for 1 day. Tube was removed and you did well. The neurology team evaluated you for seizures and did not find any active seizure or need for you to stay on medication for it. This was likely a result of your uncontrolled blood sugar. Please continue to take your Janumet 100mg daily. You will also now take Metformin ER 500mg (4 tablets in the morning). Side effects of indigestion and bloating were discussed with you. Also, be sure to continue with the dietary modifications that were discussed with you. Finally, follow up with endocrinology on 9/24. If you expereince lightheadness or severe abdominal pain seek medical attention.      SECONDARY DISCHARGE DIAGNOSES  Diagnosis: MELI (acute kidney injury)  Assessment and Plan of Treatment: MELI (acute kidney injury)    Diagnosis: Metabolic acidosis  Assessment and Plan of Treatment: PRINCIPAL DISCHARGE DIAGNOSIS  Diagnosis: Status epilepticus  Assessment and Plan of Treatment: You presented with an uncontrolled seisure that required intubation. This lasted for 1 day. Tube was removed and you did well. The neurology team evaluated you for seizures and did not find any active seizure or need for you to stay on medication for it. This was likely a result of your uncontrolled blood sugar. Please continue to take your Januvia 100mg daily. You already have this medication at home and we were therefore unable to write you a new one. You will also now take Metformin ER 500mg (4 tablets in the morning). Side effects of indigestion and bloating were discussed with you. Also, be sure to continue with the dietary modifications that were discussed with you. Finally, follow up with endocrinology on 9/24. If you expereince lightheadness or severe abdominal pain seek medical attention.      SECONDARY DISCHARGE DIAGNOSES  Diagnosis: MELI (acute kidney injury)  Assessment and Plan of Treatment: MELI (acute kidney injury)    Diagnosis: Metabolic acidosis  Assessment and Plan of Treatment:

## 2019-09-04 NOTE — CONSULT NOTE ADULT - ATTENDING COMMENTS
Pt seen on rounds this afternoon and hospital course reviewed.  Glucoses are now mostly in target range on rather modest doses of insulin.  Although it would be optimal to discharge him on insulin and then transition him to oral agents after the likely "glucose toxicity" is reversed, it may be impractical to do so given his current living situation in the residential Rehab center where he lives, and where residents have to  line for up to an hour to receive meds.  He is probably controllable with oral agents if he is able to eliminate the obvious diet lapses which have been a significant contributing factor to the recent hyperglycemia.  (He was also missing multiple doses of medication because of the wait which was entailed in getting his pills).  Will add 4 units of pre-meal lispro for now, but probably discharge on metformin and Januvia--but with the metformin at 1000 mg BID (or 2000 mg of the ER form in the morning, to facilitate compliance.  Will stay with Januvia if covered, otherwise a sulfonylurea

## 2019-09-04 NOTE — PROGRESS NOTE ADULT - REASON FOR ADMISSION
Status epilepticus

## 2019-09-04 NOTE — PROGRESS NOTE ADULT - PROBLEM SELECTOR PLAN 3
Prescription has been sent to his pharmacy.  Please help him schedule an appointment per his request.    Electronically signed:    Arnulfo Reno PA-C      On admission BUN/Cr 13/0.93.   -BUN/Cr increased to 21/2.74 on 9/2, Ulytes indicate pre-renal etiology   -BUN/Cr remain elevated 23/2.71 s/p 500cc LR bolus  -Urine output is adequate   -start maintenance LR overnight   -CK wnl  -continue to trend BMP Improved: Cr 2.71 with FeNa 0.6 suggestive of pre-renal etiology. After receiving LR overnight Cr 7.    -Urine output is adequate   -CK wnl  -continue to trend BMP  -Encourage PO intake

## 2019-09-04 NOTE — PROGRESS NOTE ADULT - PROBLEM SELECTOR PLAN 4
Hyperchloremic non-anion gap metabolic acidosis likely 2/2 to initial fluid resuscitation w/ 2L NS   -resolved  -Will monitor BMP    ADDENDUM: recurring non AG metabolic acidosis, restarted LR on RMF Improved: Hyperchloremic non-anion gap metabolic acidosis likely 2/2 to initial fluid resuscitation w/ 2L NS   -Bicarb 21  -Will monitor BMP

## 2019-09-04 NOTE — PROGRESS NOTE ADULT - PROBLEM SELECTOR PLAN 8
F: none  E: replete electrolytes K< 4, Mg <2  N: diabetic diet   DVT PPx: Lovenox  Code status: Full Code    Dispo: [facility] TSH 4.146 on admission, T3 and T4 WNL. Patient currently asymptomatic.   -No need for treatment at this time  -Endo consults

## 2019-09-04 NOTE — PROGRESS NOTE ADULT - ASSESSMENT
57 yo gentleman with MHx of opoid use disorder and DM with no history of epilepsy presented to [facility] obtunded in status epilepticus requiring intubation likely 2/2 to hyperglycemia.

## 2019-09-04 NOTE — DISCHARGE NOTE PROVIDER - CARE PROVIDER_API CALL
Endocrinology,   [address]  [address]  Phone: [phone]  Fax: (   )    -  Follow Up Time:     Gilberto Cooper  [address] [place] [address]  Phone: [phone]  Fax: (   )    -  Follow Up Time: Endocrinology,   [address]  Phone: [phone]  Fax: (   )    -  Follow Up Time:     Nikole Cooper  [address] [facility]  Phone: [phone]  Fax: (   )    -  Follow Up Time:

## 2019-09-04 NOTE — PROGRESS NOTE ADULT - PROBLEM SELECTOR PLAN 1
Improved: No known seizure history as per patient . Seizure most likely 2/2 metabolic hyperglycemia and metabolic derangement. Has remained without seizure  -Off Keppra since 9/3 3AM  -EEG disconnected overnight, will f/u with EEG if more data needed  -Remains AOx3 Improved: No known seizure history as per patient . Seizure most likely 2/2 metabolic hyperglycemia and metabolic derangement. Has remained without seizure  -Off Keppra since 9/3 3AM  -EEG disconnected overnight-Per Neuro no longer needed to monitor or need for keppra  -Remains AOx3

## 2019-09-04 NOTE — DISCHARGE NOTE PROVIDER - HOSPITAL COURSE
SHAHANA RAMIRES is 58y yo gentleman with medical history of DM and opioid use disorder on methadone who presented to [facility] in status epilepticus, found to be hyperglycemic.        Problem List/Main Diagnoses (system-based):     PSYCH:    #Opioid Use Disorder: On admission, UTOX positive for methadone and opioids. Patient with reported opioid use disorder on 4mg Methadone. Patient has not received dose since 8/31. He reports no symptoms of withdrawal or cravings. ____Returning to his inpatient rehab center for continued management.         NEURO:    #Status Epilepticus: Presented with status epilepticus requiring intubation and sedation for airway management. Blood sugars were elevated into the 370s upon admission but BHB was negative and there were no ketones in the blood. Patient remained sedated and intubated for one day before a successful extubation. The patient's mental status returned to AOx3 and neuro exam was grossly normal. Patient underwent 24h EEG without any epileptic changes. No need for keppra or continued f/u. Likely a result of hyperglycemia.         CARDIOVASCULAR:    #HTN- Patient with intermitent elevated SBP. Was not started on medication but in setting of uncontrolled HTN might be appropriate to start HTN medication as outpatient.         RENAL:    #MELI: After extubation, patient Cr 2.71 with FeNa 0.6 suggestive of prerenal etiology. Cr improved with LR and PO intake. On discharge Cr of ____.        ENDOCRINE:    #DM: Patient with presentation above. A1C 12. Endocrine ordered and they recommended  ______.        #Subclinical Hypothyroidism: TSH 4.146 on admission, T3 and T4 WNL. Patient currently asymptomatic.             New medications:     Labs to be followed outpatient: Cr    Exam to be followed outpatient: BP SHAHANA RAMIRES is 58y yo gentleman with medical history of DM and opioid use disorder on methadone who presented to [facility] in status epilepticus, found to be hyperglycemic.        Problem List/Main Diagnoses (system-based):     PSYCH:    #Opioid Use Disorder: On admission, UTOX positive for methadone and opioids. Patient with reported opioid use disorder on 4mg Methadone. He reports his last non prescribed opiate was taken ~2 weeks ago. Patient has not received methadone dose since 8/31. He reports no symptoms of withdrawal or cravings. Returning to his inpatient rehab center for continued management.         NEURO:    #Status Epilepticus: Presented with status epilepticus requiring intubation and sedation for airway management. Blood sugars were elevated into the 370s upon admission but BHB was negative and there were no ketones in the blood. Patient remained sedated and intubated for one day before a successful extubation. The patient's mental status returned to AOx3 and neuro exam was grossly normal. Patient underwent 24h EEG without any epileptic changes. No need for keppra or continued f/u. Likely a result of hyperglycemia.         CARDIOVASCULAR:    #HTN- Patient with intermitent elevated SBP. Was not started on medication in setting of MELI but in setting of uncontrolled HTN and DM would be appropriate to start lisinopril as outpatient.         RENAL:    #MELI: After extubation, patient Cr 2.71 with FeNa 0.6 suggestive of prerenal etiology. Cr improved with LR and PO intake. On discharge Cr of 1.65.        #Dysuria: Patient with new onset Dysuria. This started after removal of baker and is likely a result of this. UA was ordered and collected. Please follow this up in the outpatient setting.        HEME:    #Anemia: Patient admitted with Hb 13 that then decreased to 11 throughout his hospitalization. Other labs remained unchanged so unlikely dilutional. Patient asymptomatic w/o signs of bleeding. In setting of hospitalization and frequent blood draws this may be cause. Should be followed up outpatient.         ENDOCRINE:    #DM: Patient with presentation above. A1C 12. Endocrine ordered and they recommended  continuing Januvia 100mg and adding metformin ER 2000mg am. They will f/u with him outpatient.        #Subclinical Hypothyroidism: TSH 4.146 on admission, T3 and T4 WNL. Patient currently asymptomatic.             New medications: Metformin ER 500mg (4 pills qAM)    Labs to be followed outpatient: Cr, Hb, UA, TSH    Exam to be followed outpatient: BP

## 2019-09-04 NOTE — PROGRESS NOTE ADULT - PROBLEM SELECTOR PLAN 6
-On methadone 4mg q2 days  -Admits to recent heroin use   -Will f/u with methadone clinic for collateral information Hx of Opioid use, currently part of inpatient rehab. Receives 4mg qd, but has recently been taking in q48 in effort to taper himself off  -Will f/u with methadone clinic for collateral information- on [address]  -COWS protocol- no current signs or symptoms of withdrawal

## 2019-09-04 NOTE — DISCHARGE NOTE PROVIDER - NSDCFUADDAPPT_GEN_ALL_CORE_FT
Please follow up with your Primary Care Provider, Dr. Cooper, on _____ @ _____. Please visit the [address] office. If you need to cancel or change this appointment please call the office to make necessary changes.     Please follow up with your new Endocrinologist for Diabetes, on 9/24 @ 2pm. Please visit the [address] office. If you need to cancel or change this appointment please call the office to make necessary changes. Please follow up with your Primary Care Provider, Dr. Cooper, on 9/9 @ 2:30p. Please visit the [address] [place] [address] Ave. If you need to cancel or change this appointment please call the office to make necessary changes.     Please follow up with your new Endocrinologist for Diabetes, on 9/24 @ 2pm. Please visit the [address]  office. If you need to cancel or change this appointment please call the office to make necessary changes.

## 2019-09-04 NOTE — PROGRESS NOTE ADULT - PROBLEM SELECTOR PLAN 10
1) PCP Contacted on Admission: (Y/N) --> Name & Phone #: Patient unsure  2) Date of Contact with PCP: TBD  3) PCP Contacted at Discharge: TBD  4) Summary of Handoff Given to PCP: TBD   5) Post-Discharge Appointment Date: TBD

## 2019-09-04 NOTE — PROGRESS NOTE ADULT - SUBJECTIVE AND OBJECTIVE BOX
OVERNIGHT EVENTS: No acute events overnight reported by patient or staff    SUBJECTIVE / INTERVAL HPI: Patient seen and examined at bedside. No acute complaints. Feels he is doing well. He did mention that leading up to the event he had 3 days of h/a managed with tylenol. Patient denies headache, fever, chills, lacrimation, chest pain, SOB, abdominal pain, diarrhea, piloerection, Nausea/Vomiting, or numbness/tingling.      .  VITAL SIGNS:  T(C): 36.4 (09-04-19 @ 09:37), Max: 37.2 (09-03-19 @ 18:35)  T(F): 97.6 (09-04-19 @ 09:37), Max: 99 (09-03-19 @ 18:35)  HR: 69 (09-04-19 @ 09:37) (58 - 69)  BP: 130/75 (09-04-19 @ 09:37) (120/72 - 166/85)  BP(mean): 119 (09-03-19 @ 20:21) (102 - 119)  RR: 16 (09-04-19 @ 09:37) (15 - 18)  SpO2: 98% (09-04-19 @ 09:37) (97% - 100%)  Wt(kg): --    PHYSICAL EXAM:    Constitutional: WDWN resting comfortably in bed; NAD  Head: NC/AT, EEG leads on head  Eyes: EOMI, anicteric sclera  ENT: no nasal discharge  Neck: supple  Respiratory: CTA b/l, no increased work of breathing  Cardiac: +S1/S2, regular rate  Gastrointestinal: soft, NT/ND; no rebound or guarding; +BS  Extremities: WWP, no peripheral edema, 2+ pulses Radial and DP  Musculoskeletal: NROM x4  Dermatologic: skin warm, dry  Neurologic: Alert and oriented, no focal deficits appreciated  Psychiatric: affect and characteristics of appearance, verbalizations, behaviors are appropriate      09-03-19 @ 07:01  -  09-04-19 @ 07:00  --------------------------------------------------------  IN: 650 mL / OUT: 2200 mL / NET: -1550 mL        MEDICATIONS:  MEDICATIONS  (STANDING):  dextrose 5%. 1000 milliLiter(s) (50 mL/Hr) IV Continuous <Continuous>  dextrose 50% Injectable 12.5 Gram(s) IV Push once  dextrose 50% Injectable 25 Gram(s) IV Push once  dextrose 50% Injectable 25 Gram(s) IV Push once  enoxaparin Injectable 40 milliGRAM(s) SubCutaneous every 24 hours  insulin glargine Injectable (LANTUS) 12 Unit(s) SubCutaneous at bedtime  insulin lispro (HumaLOG) corrective regimen sliding scale   SubCutaneous Before meals and at bedtime  lactated ringers. 1000 milliLiter(s) (130 mL/Hr) IV Continuous <Continuous>    MEDICATIONS  (PRN):  dextrose 40% Gel 15 Gram(s) Oral once PRN Blood Glucose LESS THAN 70 milliGRAM(s)/deciliter  glucagon  Injectable 1 milliGRAM(s) IntraMuscular once PRN Glucose LESS THAN 70 milligrams/deciliter      ALLERGIES:  Allergies    Allergy Status Unknown    Intolerances        Pertinent LABS, RADIOLOGY, MICROBIOLOGY, and ADDITIONAL TESTS reviewed:   .  LABS:                         11.8   8.55  )-----------( 236      ( 04 Sep 2019 07:38 )             36.5     09-04    142  |  111<H>  |  23  ----------------------------<  118<H>  3.9   |  21<L>  |  2.01<H>    Ca    8.6      04 Sep 2019 07:38  Phos  3.3     09-04  Mg     2.3     09-04                    RADIOLOGY, EKG & ADDITIONAL TESTS:   No New Imaging

## 2019-09-04 NOTE — DISCHARGE NOTE PROVIDER - PROVIDER TOKENS
FREE:[LAST:[Endocrinology],PHONE:[phone]],FAX:[(   )    -],ADDRESS:[[address] [place] [address]]],FREE:[LAST:[Kenneth],FIRST:[Gilberto],PHONE:[phone]],FAX:[(   )    -],ADDRESS:[[address] [place] [address]]] FREE:[LAST:[Endocrinology],PHONE:[phone]],FAX:[(   )    -],ADDRESS:[[address] [place] [address]]],FREE:[LAST:[Kenneth],FIRST:[Oaul],PHONE:[phone]],FAX:[(   )    -],ADDRESS:[[address] [place] [address]]]

## 2019-09-05 ENCOUNTER — TRANSCRIPTION ENCOUNTER (OUTPATIENT)
Age: 59
End: 2019-09-05

## 2019-09-05 VITALS
HEART RATE: 70 BPM | TEMPERATURE: 98 F | RESPIRATION RATE: 18 BRPM | DIASTOLIC BLOOD PRESSURE: 72 MMHG | SYSTOLIC BLOOD PRESSURE: 135 MMHG | OXYGEN SATURATION: 100 %

## 2019-09-05 PROBLEM — Z78.9 OTHER SPECIFIED HEALTH STATUS: Chronic | Status: ACTIVE | Noted: 2019-09-01

## 2019-09-05 LAB
ANION GAP SERPL CALC-SCNC: 10 MMOL/L — SIGNIFICANT CHANGE UP (ref 5–17)
BUN SERPL-MCNC: 17 MG/DL — SIGNIFICANT CHANGE UP (ref 7–23)
C PEPTIDE SERPL-MCNC: 1.2 NG/ML — SIGNIFICANT CHANGE UP (ref 1.1–4.4)
CALCIUM SERPL-MCNC: 8.7 MG/DL — SIGNIFICANT CHANGE UP (ref 8.4–10.5)
CHLORIDE SERPL-SCNC: 110 MMOL/L — HIGH (ref 96–108)
CO2 SERPL-SCNC: 24 MMOL/L — SIGNIFICANT CHANGE UP (ref 22–31)
CREAT SERPL-MCNC: 1.65 MG/DL — HIGH (ref 0.5–1.3)
GLUCOSE SERPL-MCNC: 135 MG/DL — HIGH (ref 70–99)
HCT VFR BLD CALC: 35.3 % — LOW (ref 39–50)
HGB BLD-MCNC: 11.5 G/DL — LOW (ref 13–17)
MAGNESIUM SERPL-MCNC: 1.9 MG/DL — SIGNIFICANT CHANGE UP (ref 1.6–2.6)
MCHC RBC-ENTMCNC: 29.8 PG — SIGNIFICANT CHANGE UP (ref 27–34)
MCHC RBC-ENTMCNC: 32.6 GM/DL — SIGNIFICANT CHANGE UP (ref 32–36)
MCV RBC AUTO: 91.5 FL — SIGNIFICANT CHANGE UP (ref 80–100)
NRBC # BLD: 0 /100 WBCS — SIGNIFICANT CHANGE UP (ref 0–0)
PLATELET # BLD AUTO: 256 K/UL — SIGNIFICANT CHANGE UP (ref 150–400)
POTASSIUM SERPL-MCNC: 4 MMOL/L — SIGNIFICANT CHANGE UP (ref 3.5–5.3)
POTASSIUM SERPL-SCNC: 4 MMOL/L — SIGNIFICANT CHANGE UP (ref 3.5–5.3)
RBC # BLD: 3.86 M/UL — LOW (ref 4.2–5.8)
RBC # FLD: 13.2 % — SIGNIFICANT CHANGE UP (ref 10.3–14.5)
SODIUM SERPL-SCNC: 144 MMOL/L — SIGNIFICANT CHANGE UP (ref 135–145)
WBC # BLD: 7.79 K/UL — SIGNIFICANT CHANGE UP (ref 3.8–10.5)
WBC # FLD AUTO: 7.79 K/UL — SIGNIFICANT CHANGE UP (ref 3.8–10.5)

## 2019-09-05 PROCEDURE — 85027 COMPLETE CBC AUTOMATED: CPT

## 2019-09-05 PROCEDURE — 84300 ASSAY OF URINE SODIUM: CPT

## 2019-09-05 PROCEDURE — 80307 DRUG TEST PRSMV CHEM ANLYZR: CPT

## 2019-09-05 PROCEDURE — 96372 THER/PROPH/DIAG INJ SC/IM: CPT | Mod: XU

## 2019-09-05 PROCEDURE — 87086 URINE CULTURE/COLONY COUNT: CPT

## 2019-09-05 PROCEDURE — 99291 CRITICAL CARE FIRST HOUR: CPT | Mod: 25

## 2019-09-05 PROCEDURE — 87040 BLOOD CULTURE FOR BACTERIA: CPT

## 2019-09-05 PROCEDURE — 84439 ASSAY OF FREE THYROXINE: CPT

## 2019-09-05 PROCEDURE — 36415 COLL VENOUS BLD VENIPUNCTURE: CPT

## 2019-09-05 PROCEDURE — 84480 ASSAY TRIIODOTHYRONINE (T3): CPT

## 2019-09-05 PROCEDURE — 99239 HOSP IP/OBS DSCHRG MGMT >30: CPT | Mod: GC

## 2019-09-05 PROCEDURE — 80053 COMPREHEN METABOLIC PANEL: CPT

## 2019-09-05 PROCEDURE — 99292 CRITICAL CARE ADDL 30 MIN: CPT | Mod: 25

## 2019-09-05 PROCEDURE — 82550 ASSAY OF CK (CPK): CPT

## 2019-09-05 PROCEDURE — 95951: CPT

## 2019-09-05 PROCEDURE — 82962 GLUCOSE BLOOD TEST: CPT

## 2019-09-05 PROCEDURE — 83605 ASSAY OF LACTIC ACID: CPT

## 2019-09-05 PROCEDURE — 84436 ASSAY OF TOTAL THYROXINE: CPT

## 2019-09-05 PROCEDURE — 84100 ASSAY OF PHOSPHORUS: CPT

## 2019-09-05 PROCEDURE — 85730 THROMBOPLASTIN TIME PARTIAL: CPT

## 2019-09-05 PROCEDURE — 86803 HEPATITIS C AB TEST: CPT

## 2019-09-05 PROCEDURE — 70450 CT HEAD/BRAIN W/O DYE: CPT

## 2019-09-05 PROCEDURE — 31500 INSERT EMERGENCY AIRWAY: CPT

## 2019-09-05 PROCEDURE — 80048 BASIC METABOLIC PNL TOTAL CA: CPT

## 2019-09-05 PROCEDURE — 84443 ASSAY THYROID STIM HORMONE: CPT

## 2019-09-05 PROCEDURE — 83036 HEMOGLOBIN GLYCOSYLATED A1C: CPT

## 2019-09-05 PROCEDURE — 84484 ASSAY OF TROPONIN QUANT: CPT

## 2019-09-05 PROCEDURE — 84681 ASSAY OF C-PEPTIDE: CPT

## 2019-09-05 PROCEDURE — 83735 ASSAY OF MAGNESIUM: CPT

## 2019-09-05 PROCEDURE — 96374 THER/PROPH/DIAG INJ IV PUSH: CPT | Mod: XU

## 2019-09-05 PROCEDURE — 96375 TX/PRO/DX INJ NEW DRUG ADDON: CPT | Mod: XU

## 2019-09-05 PROCEDURE — 85610 PROTHROMBIN TIME: CPT

## 2019-09-05 PROCEDURE — 80184 ASSAY OF PHENOBARBITAL: CPT

## 2019-09-05 PROCEDURE — 71045 X-RAY EXAM CHEST 1 VIEW: CPT

## 2019-09-05 PROCEDURE — 83935 ASSAY OF URINE OSMOLALITY: CPT

## 2019-09-05 PROCEDURE — 85025 COMPLETE CBC W/AUTO DIFF WBC: CPT

## 2019-09-05 PROCEDURE — 82570 ASSAY OF URINE CREATININE: CPT

## 2019-09-05 PROCEDURE — 72125 CT NECK SPINE W/O DYE: CPT

## 2019-09-05 PROCEDURE — 84540 ASSAY OF URINE/UREA-N: CPT

## 2019-09-05 PROCEDURE — 82803 BLOOD GASES ANY COMBINATION: CPT

## 2019-09-05 PROCEDURE — 81001 URINALYSIS AUTO W/SCOPE: CPT

## 2019-09-05 PROCEDURE — 82010 KETONE BODYS QUAN: CPT

## 2019-09-05 PROCEDURE — 80185 ASSAY OF PHENYTOIN TOTAL: CPT

## 2019-09-05 RX ORDER — METFORMIN HYDROCHLORIDE 850 MG/1
2 TABLET ORAL
Qty: 60 | Refills: 0
Start: 2019-09-05 | End: 2019-10-04

## 2019-09-05 RX ORDER — METFORMIN HYDROCHLORIDE 850 MG/1
4 TABLET ORAL
Qty: 120 | Refills: 0
Start: 2019-09-05 | End: 2019-10-04

## 2019-09-05 RX ORDER — SITAGLIPTIN 50 MG/1
1 TABLET, FILM COATED ORAL
Qty: 0 | Refills: 0 | DISCHARGE
Start: 2019-09-05

## 2019-09-05 RX ADMIN — Medication 4 UNIT(S): at 12:42

## 2019-09-05 RX ADMIN — Medication 4 UNIT(S): at 09:07

## 2019-09-05 NOTE — DISCHARGE NOTE NURSING/CASE MANAGEMENT/SOCIAL WORK - NSDCPEEMAIL_GEN_ALL_CORE
[facility] for Tobacco Control email [email or web address]

## 2019-09-05 NOTE — DISCHARGE NOTE NURSING/CASE MANAGEMENT/SOCIAL WORK - NSDCPEWEB_GEN_ALL_CORE
What Type Of Note Output Would You Prefer (Optional)?: Standard Output Hpi Title: Evaluation of Skin Lesions How Severe Are Your Spot(S)?: mild Have Your Spot(S) Been Treated In The Past?: has not been treated Family Member: Brother,Mother, Father [place] website --- [email or web address] website --- [email or web address]

## 2019-09-05 NOTE — DISCHARGE NOTE NURSING/CASE MANAGEMENT/SOCIAL WORK - PATIENT PORTAL LINK FT
You can access the FollowMyHealth Patient Portal offered by [facility] by registering at the following website: [email or web address]. By joining [employer]’s FollowMyHealth portal, you will also be able to view your health information using other applications (apps) compatible with our system.

## 2019-09-05 NOTE — DISCHARGE NOTE NURSING/CASE MANAGEMENT/SOCIAL WORK - NSDCFUADDAPPT_GEN_ALL_CORE_FT
Please follow up with your Primary Care Provider, Dr. Cooper, on 9/9 @ 2:30p. Please visit the [address] [place] [address] Ave. If you need to cancel or change this appointment please call the office to make necessary changes.     Please follow up with your new Endocrinologist for Diabetes, on 9/24 @ 2pm. Please visit the [address]  office. If you need to cancel or change this appointment please call the office to make necessary changes.

## 2019-09-05 NOTE — CHART NOTE - NSCHARTNOTEFT_GEN_A_CORE
Patient was being planned for discharge today. He is going back to the rehab facility.  His FSG  FSG & Insulin received:  Yesterday:  pre-dinner fs, 2 units lispro SS  bedtime fs, 12 lantus   units +  2  units lispro SS  Today:  pre-breakfast fs, 4 nutritional lispro   units  pre-lunch fs, 4 nutritional lispro   units      Given that he is going back to the rehab facility where he said that he has to wait for a long time in the queque and misses his dose, the plan was to give him his meds once a day in the morning that can help with his medication complaince.  he can discharged on metformin 500mg ER - 4 tabs in the morning and Januvia 100mg once daily in the morning.  Plan was discussed with  and the primary team was updated about the same.   He was also provided diabetic education Patient was being planned for discharge today. He is going back to the rehab facility.  His FSG  FSG & Insulin received:  Yesterday:  pre-dinner fs, 2 units lispro SS  bedtime fs, 12 lantus   units +  2  units lispro SS  Today:  pre-breakfast fs, 4 nutritional lispro   units  pre-lunch fs, 4 nutritional lispro   units      Given that he is going back to the rehab facility where he said that he has to wait for a long time in the queue and misses his dose, the plan was to give him his meds once a day in the morning that can help with his medication compliance.  he can discharged on metformin 500mg ER - 4 tabs in the morning and Januvia 100mg once daily in the morning.  Plan was discussed with  and the primary team was updated about the same.   He was also provided diabetic education    Attending:  Above discussed with Dr. Gold prior to the pt's discharge    ARSENIO Rojsa MD

## 2019-09-06 LAB
CULTURE RESULTS: SIGNIFICANT CHANGE UP
CULTURE RESULTS: SIGNIFICANT CHANGE UP
SPECIMEN SOURCE: SIGNIFICANT CHANGE UP
SPECIMEN SOURCE: SIGNIFICANT CHANGE UP

## 2019-09-09 DIAGNOSIS — E87.2 ACIDOSIS: ICD-10-CM

## 2019-09-09 DIAGNOSIS — G40.901 EPILEPSY, UNSPECIFIED, NOT INTRACTABLE, WITH STATUS EPILEPTICUS: ICD-10-CM

## 2019-09-09 DIAGNOSIS — F11.99 OPIOID USE, UNSPECIFIED WITH UNSPECIFIED OPIOID-INDUCED DISORDER: ICD-10-CM

## 2019-09-09 DIAGNOSIS — E87.3 ALKALOSIS: ICD-10-CM

## 2019-09-09 DIAGNOSIS — E11.65 TYPE 2 DIABETES MELLITUS WITH HYPERGLYCEMIA: ICD-10-CM

## 2019-09-09 DIAGNOSIS — D64.9 ANEMIA, UNSPECIFIED: ICD-10-CM

## 2019-09-09 DIAGNOSIS — R56.9 UNSPECIFIED CONVULSIONS: ICD-10-CM

## 2019-09-09 DIAGNOSIS — F11.20 OPIOID DEPENDENCE, UNCOMPLICATED: ICD-10-CM

## 2019-09-09 DIAGNOSIS — N17.9 ACUTE KIDNEY FAILURE, UNSPECIFIED: ICD-10-CM

## 2019-09-09 DIAGNOSIS — E11.10 TYPE 2 DIABETES MELLITUS WITH KETOACIDOSIS WITHOUT COMA: ICD-10-CM

## 2019-09-09 DIAGNOSIS — Z79.4 LONG TERM (CURRENT) USE OF INSULIN: ICD-10-CM

## 2019-09-09 DIAGNOSIS — G93.41 METABOLIC ENCEPHALOPATHY: ICD-10-CM

## 2019-09-09 DIAGNOSIS — J96.00 ACUTE RESPIRATORY FAILURE, UNSPECIFIED WHETHER WITH HYPOXIA OR HYPERCAPNIA: ICD-10-CM

## 2019-09-24 ENCOUNTER — APPOINTMENT (OUTPATIENT)
Dept: ENDOCRINOLOGY | Facility: CLINIC | Age: 59
End: 2019-09-24

## 2023-10-13 NOTE — ED PROVIDER NOTE - CPE EDP CARDIAC NORM
Vascular Access Assessment and Midline Insertion    Patient is on 3N and has an order for a Midline catheter so the Vascular Access Team has been contacted.     Using maximum barrier precautions and sterile technique, inserted a Bard Power Injectable Midline 3F Catheter using ultrasound guidance. Catheter was trimmed at 13 cm, 0cm out, in the left upper arm, basilic vein. Neutral injection cap was attached and catheter flushes easily with blood return. statloc and CHG occlusive dressing were placed at the site. Arm circumference is 25 cm.      Patient tolerated procedure.  Reported to DELICIA Barriga.    IMPORTANT EDUCATION FOR CATHETER CARE:    Flush catheter with 10ml NS before and after every infusion to keep catheter patent.  Wear gloves and scrub the hub with alcohol every time the catheter is used.  Change dressing every 7 days, or sooner ONLY if dressing is soiled, saturated, or non-occlusive.  Change end caps every 7 days when dressing is changed.  Cover dressing during shower to keep dry.  RN only to remove catheter when infusion is complete.      Line inserted by: Judie DORAN Vascular Access Team RN     normal...

## 2023-12-08 NOTE — DISCHARGE NOTE NURSING/CASE MANAGEMENT/SOCIAL WORK - NSDCPEPTCAREGIVEDUMATLIST _GEN_ALL_CORE
Diabetes I woke up yesterday morning with a HA and rt sided neck pain and my dtr came by and monitored by blood pressure and it was high so I came to the hospital

## 2024-08-07 NOTE — CONSULT NOTE ADULT - REASON FOR ADMISSION
Attempted to reach patient to remind them about appointment scheduled with Nicholas Garcia MD on 8/8/24 in our [place] location.    A voicemail was left with a call back number if the patient has questions or would like to reschedule.    
Status epilepticus
Seizures